# Patient Record
Sex: MALE | Race: WHITE | Employment: FULL TIME | URBAN - METROPOLITAN AREA
[De-identification: names, ages, dates, MRNs, and addresses within clinical notes are randomized per-mention and may not be internally consistent; named-entity substitution may affect disease eponyms.]

---

## 2017-01-05 ENCOUNTER — TELEPHONE (OUTPATIENT)
Dept: FAMILY MEDICINE CLINIC | Age: 52
End: 2017-01-05

## 2017-01-05 NOTE — TELEPHONE ENCOUNTER
appt is 2/13/16 at 6:30 pm  Dr. Amber Seaman  office  [phone]  Fax [phone]        [phone]        Sched Appt Date/Time   Procedure Information   Procedure Modifiers Provider Requested Approved   [id number] - REFERRAL FOR COLONOSCOPY   1 1      Diagnosis Information   Diagnosis   Z00.00 (ICD-10-CM) - Annual physical exam      Referral Order   Order   REFERRAL FOR COLONOSCOPY (Order # [id number]) on 11/18/2016   View Encounter

## 2017-02-01 NOTE — TELEPHONE ENCOUNTER
Patient called for update. He has been on his insurance web site and does not see the referral request as of yet.

## 2017-02-13 ENCOUNTER — HOSPITAL ENCOUNTER (OUTPATIENT)
Age: 52
Setting detail: OUTPATIENT SURGERY
Discharge: HOME | End: 2017-02-13
Attending: INTERNAL MEDICINE | Admitting: INTERNAL MEDICINE
Payer: OTHER GOVERNMENT

## 2017-02-13 ENCOUNTER — ANESTHESIA EVENT (OUTPATIENT)
Dept: ENDOSCOPY | Age: 52
End: 2017-02-13
Payer: OTHER GOVERNMENT

## 2017-02-13 ENCOUNTER — SURGERY (OUTPATIENT)
Age: 52
End: 2017-02-13

## 2017-02-13 ENCOUNTER — ANESTHESIA (OUTPATIENT)
Dept: ENDOSCOPY | Age: 52
End: 2017-02-13
Payer: OTHER GOVERNMENT

## 2017-02-13 VITALS
HEART RATE: 65 BPM | TEMPERATURE: 97.6 F | BODY MASS INDEX: 25.62 KG/M2 | WEIGHT: 179 LBS | HEIGHT: 70 IN | OXYGEN SATURATION: 100 % | RESPIRATION RATE: 14 BRPM | DIASTOLIC BLOOD PRESSURE: 87 MMHG | SYSTOLIC BLOOD PRESSURE: 133 MMHG

## 2017-02-13 PROBLEM — Z98.890 S/P COLONOSCOPY: Status: ACTIVE | Noted: 2017-02-13

## 2017-02-13 PROCEDURE — 77030011640 HC PAD GRND REM COVD -A: Performed by: INTERNAL MEDICINE

## 2017-02-13 PROCEDURE — 74011250636 HC RX REV CODE- 250/636: Performed by: INTERNAL MEDICINE

## 2017-02-13 PROCEDURE — 88305 TISSUE EXAM BY PATHOLOGIST: CPT | Performed by: INTERNAL MEDICINE

## 2017-02-13 PROCEDURE — 77030013992 HC SNR POLYP ENDOSC BSC -B: Performed by: INTERNAL MEDICINE

## 2017-02-13 PROCEDURE — 76040000019: Performed by: INTERNAL MEDICINE

## 2017-02-13 PROCEDURE — 74011250636 HC RX REV CODE- 250/636

## 2017-02-13 PROCEDURE — 76060000031 HC ANESTHESIA FIRST 0.5 HR: Performed by: INTERNAL MEDICINE

## 2017-02-13 PROCEDURE — 77030009426 HC FCPS BIOP ENDOSC BSC -B: Performed by: INTERNAL MEDICINE

## 2017-02-13 RX ORDER — PROPOFOL 10 MG/ML
INJECTION, EMULSION INTRAVENOUS AS NEEDED
Status: DISCONTINUED | OUTPATIENT
Start: 2017-02-13 | End: 2017-02-13 | Stop reason: HOSPADM

## 2017-02-13 RX ORDER — NALOXONE HYDROCHLORIDE 0.4 MG/ML
0.4 INJECTION, SOLUTION INTRAMUSCULAR; INTRAVENOUS; SUBCUTANEOUS
Status: DISCONTINUED | OUTPATIENT
Start: 2017-02-13 | End: 2017-02-13 | Stop reason: HOSPADM

## 2017-02-13 RX ORDER — PROPOFOL 10 MG/ML
INJECTION, EMULSION INTRAVENOUS
Status: DISCONTINUED | OUTPATIENT
Start: 2017-02-13 | End: 2017-02-13 | Stop reason: HOSPADM

## 2017-02-13 RX ORDER — SODIUM CHLORIDE 9 MG/ML
INJECTION, SOLUTION INTRAVENOUS
Status: DISCONTINUED | OUTPATIENT
Start: 2017-02-13 | End: 2017-02-13 | Stop reason: HOSPADM

## 2017-02-13 RX ORDER — MIDAZOLAM HYDROCHLORIDE 1 MG/ML
.25-5 INJECTION, SOLUTION INTRAMUSCULAR; INTRAVENOUS
Status: DISCONTINUED | OUTPATIENT
Start: 2017-02-13 | End: 2017-02-13 | Stop reason: HOSPADM

## 2017-02-13 RX ORDER — FENTANYL CITRATE 50 UG/ML
100 INJECTION, SOLUTION INTRAMUSCULAR; INTRAVENOUS
Status: DISCONTINUED | OUTPATIENT
Start: 2017-02-13 | End: 2017-02-13 | Stop reason: HOSPADM

## 2017-02-13 RX ORDER — ATROPINE SULFATE 0.1 MG/ML
0.5 INJECTION INTRAVENOUS
Status: DISCONTINUED | OUTPATIENT
Start: 2017-02-13 | End: 2017-02-13 | Stop reason: HOSPADM

## 2017-02-13 RX ORDER — SODIUM CHLORIDE 9 MG/ML
50 INJECTION, SOLUTION INTRAVENOUS CONTINUOUS
Status: DISCONTINUED | OUTPATIENT
Start: 2017-02-13 | End: 2017-02-13 | Stop reason: HOSPADM

## 2017-02-13 RX ORDER — EPINEPHRINE 0.1 MG/ML
1 INJECTION INTRACARDIAC; INTRAVENOUS
Status: DISCONTINUED | OUTPATIENT
Start: 2017-02-13 | End: 2017-02-13 | Stop reason: HOSPADM

## 2017-02-13 RX ORDER — DEXTROMETHORPHAN/PSEUDOEPHED 2.5-7.5/.8
1.2 DROPS ORAL
Status: DISCONTINUED | OUTPATIENT
Start: 2017-02-13 | End: 2017-02-13 | Stop reason: HOSPADM

## 2017-02-13 RX ORDER — FLUMAZENIL 0.1 MG/ML
0.2 INJECTION INTRAVENOUS
Status: DISCONTINUED | OUTPATIENT
Start: 2017-02-13 | End: 2017-02-13 | Stop reason: HOSPADM

## 2017-02-13 RX ADMIN — SODIUM CHLORIDE 50 ML/HR: 900 INJECTION, SOLUTION INTRAVENOUS at 07:18

## 2017-02-13 RX ADMIN — SODIUM CHLORIDE: 9 INJECTION, SOLUTION INTRAVENOUS at 08:06

## 2017-02-13 RX ADMIN — PROPOFOL 100 MG: 10 INJECTION, EMULSION INTRAVENOUS at 08:11

## 2017-02-13 RX ADMIN — PROPOFOL 100 MCG/KG/MIN: 10 INJECTION, EMULSION INTRAVENOUS at 08:11

## 2017-02-13 NOTE — ANESTHESIA POSTPROCEDURE EVALUATION
Post-Anesthesia Evaluation and Assessment    Patient: Gelacio Silver MRN: [id number]  SSN: [id number]    YOB: 1965  Age: 49 y.o. Sex: male       Cardiovascular Function/Vital Signs  Visit Vitals    /88    Pulse 74    Temp 36.4 °C (97.6 °F)    Resp 17    Ht 5' 10\" (1.778 m)    Wt 81.2 kg (179 lb)    SpO2 100%    BMI 25.68 kg/m2       Patient is status post MAC anesthesia for Procedure(s):  COLONOSCOPY  ENDOSCOPIC POLYPECTOMY. Nausea/Vomiting: None    Postoperative hydration reviewed and adequate. Pain:  Pain Scale 1: Numeric (0 - 10) (02/13/17 0851)  Pain Intensity 1: 0 (02/13/17 0851)   Managed    Neurological Status: At baseline    Mental Status and Level of Consciousness: Arousable    Pulmonary Status:   O2 Device: Room air (02/13/17 0851)   Adequate oxygenation and airway patent    Complications related to anesthesia: None    Post-anesthesia assessment completed.  No concerns    Signed By: Oriana Dumas MD     February 13, 2017

## 2017-02-13 NOTE — PROCEDURES
[address]  [phone]      2017    Colonoscopy Procedure Note  Charlene Huang  :  1965  [facility] Medical Record Number: [id number]    Indications:     Screening colonoscopy  PCP:  Ghazal Tadeo MD  Anesthesia/Sedation: Conscious Sedation/Moderate Sedation  Endoscopist:  Dr. Jaya Nixon  Complications:  None  Estimate Blood Loss:  None    Permit:  The indications, risks, benefits and alternatives were reviewed with the patient or their decision maker who was provided an opportunity to ask questions and all questions were answered. The specific risks of colonoscopy with conscious sedation were reviewed, including but not limited to anesthetic complication, bleeding, adverse drug reaction, missed lesion, infection, IV site reactions, and intestinal perforation which would lead to the need for surgical repair. Alternatives to colonoscopy including radiographic imaging, observation without testing, or laboratory testing were reviewed including the limitations of those alternatives. After considering the options and having all their questions answered, the patient or their decision maker provided both verbal and written consent to proceed. Procedure in Detail:  After obtaining informed consent, positioning of the patient in the left lateral decubitus position, and conduction of a pre-procedure pause or \"time out\" the endoscope was introduced into the anus and advanced to the terminal ileum. The quality of the colonic preparation was adequate. A careful inspection was made as the colonoscope was withdrawn, findings and interventions are described below.     Appendiceal orifice photgraphed    Findings:       - Diverticulosis; no inflammation  Two sessile transverse polyps, 8 and 12 mm size    Specimens:    both polyps removed cold snare    Complications:   None; patient tolerated the procedure well. Estimated blood loss: none    Impression:  colon polyps, diverticulosis    Recommendations:     - Repeat colonoscopy in 5 years. - high fiber diet    Thank you for entrusting me with this patient's care. Please do not hesitate to contact me with any questions or if I can be of assistance with any of your other patients' GI needs.     Signed By: Zulay Leiva MD                        February 13, 2017

## 2017-02-13 NOTE — DISCHARGE INSTRUCTIONS
Ocalasonny Pulido  [id number]  1965    DISCHARGE INSTRUCTIONS  Discomfort:  Redness at IV site- apply warm compress to area; if redness or soreness persist- contact your physician. There may be a slight amount of blood passed from the rectum. Gaseous discomfort - walking, belching will help relieve any discomfort. You may not operate a vehicle for 12 hours. You may not engage in an occupation involving machinery or appliances for rest of today. You may not drink alcoholic beverages for at least 12 hours. Avoid making any critical decisions for at least 24 hours. DIET:   High fiber diet. - however -  remember your colon is empty and a heavy meal will produce gas. Avoid these foods:  vegetables, fried / greasy foods, carbonated drinks for today. ACTIVITY:  You may resume your normal daily activities it is recommended that you spend the remainder of the day resting -  avoid any strenuous activity. CALL M.D.   ANY SIGN OF:   Increasing pain, nausea, vomiting  Abdominal distension (swelling)  New increased bleeding (oral or rectal)  Fever (chills)  Pain in chest area  Bloody discharge from nose or mouth  Shortness of breath     Follow-up Instructions:  See Dr. Veto Guzmán in five years for follow up exam      DISCHARGE SUMMARY from Nurse    The following personal items collected during your admission are returned to you:   Dental Appliance: Dental Appliances: None  Vision: Visual Aid: Glasses, At home  Hearing Aid:    Jewelry:    Clothing:    Other Valuables:    Valuables sent to safe:

## 2017-02-13 NOTE — IP AVS SNAPSHOT
[address] 
[phone] Patient: Thomas Rg MRN: [id number] :1965 You are allergic to the following No active allergies Recent Documentation Height Weight BMI Smoking Status 1.778 m 81.2 kg 25.68 kg/m2 Never Smoker Emergency Contacts Name Discharge Info Relation Home Work Mobile Hattie Gibbs DISCHARGE CAREGIVER [3] Spouse [3] [phone] About your hospitalization You were admitted on:  2017 You last received care in the:  [facility] ENDOSCOPY You were discharged on:  2017 Unit phone number:  [phone] Why you were hospitalized Your primary diagnosis was:  Not on File Providers Seen During Your Hospitalizations Provider Role Specialty Primary office phone Bina Duarte MD Attending Provider Gastroenterology [phone] Your Primary Care Physician (PCP) Primary Care Physician Office Phone Office Fax Zach Nelson [phone] Follow-up Information Follow up With Details Comments Contact Info MD Gabby Gilbert [facility] [phone] [address] 
[phone] Current Discharge Medication List  
  
CONTINUE these medications which have NOT CHANGED Dose & Instructions Dispensing Information Comments Morning Noon Evening Bedtime GINKOBA PO Your next dose is: Today, Tomorrow Other:  _________ Take  by mouth. Refills:  0 MULTIVITAMIN PO Your next dose is: Today, Tomorrow Other:  _________ Take  by mouth. Refills:  0 Omeprazole delayed release 20 mg tablet Commonly known as:  PRILOSEC D/R Your next dose is: Today, Tomorrow Other:  _________  Dose:  20 mg  
 Take 20 mg by mouth daily. Refills:  0 Discharge Instructions [facility] 
[id number] 
1965 DISCHARGE INSTRUCTIONS Discomfort: 
Redness at IV site- apply warm compress to area; if redness or soreness persist- contact your physician. There may be a slight amount of blood passed from the rectum. Gaseous discomfort - walking, belching will help relieve any discomfort. You may not operate a vehicle for 12 hours. You may not engage in an occupation involving machinery or appliances for rest of today. You may not drink alcoholic beverages for at least 12 hours. Avoid making any critical decisions for at least 24 hours. DIET: 
 High fiber diet.  however -  remember your colon is empty and a heavy meal will produce gas. Avoid these foods:  vegetables, fried / greasy foods, carbonated drinks for today. ACTIVITY: 
You may resume your normal daily activities it is recommended that you spend the remainder of the day resting -  avoid any strenuous activity. CALL M.D. ANY SIGN OF: Increasing pain, nausea, vomiting Abdominal distension (swelling) New increased bleeding (oral or rectal) Fever (chills) Pain in chest area Bloody discharge from nose or mouth Shortness of breath Follow-up Instructions: 
See Dr. Tamra Marcus in five years for follow up exam 
 
 
DISCHARGE SUMMARY from Nurse The following personal items collected during your admission are returned to you:  
Dental Appliance: Dental Appliances: None Vision: Visual Aid: Glasses, At home Hearing Aid:   
Jewelry:   
Clothing:   
Other Valuables:   
Valuables sent to safe:   
 
 
Discharge Orders None Introducing [facility]! Mauricio Merida introduces fromAtoB patient portal. Now you can access parts of your medical record, email your doctor's office, and request medication refills online. 1. In your internet browser, go to [email or web address] 2. Click on the First Time User? Click Here link in the Sign In box. You will see the New Member Sign Up page. 3. Enter your [facility] Access Code exactly as it appears below. You will not need to use this code after youve completed the sign-up process. If you do not sign up before the expiration date, you must request a new code. · [facility] Access Code: [email or web address] Expires: 5/14/2017  6:28 AM 
 
4. Enter the last four digits of your Social Security Number (xxxx) and Date of Birth (mm/dd/yyyy) as indicated and click Submit. You will be taken to the next sign-up page. 5. Create a [facility] ID. This will be your [facility] login ID and cannot be changed, so think of one that is secure and easy to remember. 6. Create a [facility] password. You can change your password at any time. 7. Enter your Password Reset Question and Answer. This can be used at a later time if you forget your password. 8. Enter your e-mail address. You will receive e-mail notification when new information is available in [address] 9. Click Sign Up. You can now view and download portions of your medical record. 10. Click the Download Summary menu link to download a portable copy of your medical information. If you have questions, please visit the Frequently Asked Questions section of the [facility] website. Remember, [facility] is NOT to be used for urgent needs. For medical emergencies, dial 911. Now available from your iPhone and Android! General Information Please provide this summary of care documentation to your next provider. Patient Signature:  ____________________________________________________________ Date:  ____________________________________________________________  
  
Marco Pieterie Provider Signature:  ____________________________________________________________ Date:  ____________________________________________________________

## 2017-02-13 NOTE — PERIOP NOTES
Pt resting comfortably on stretcher HOB up VSS call bell within reach awaiting MD for procedure will continue to monitor pt.

## 2017-02-13 NOTE — ANESTHESIA PREPROCEDURE EVALUATION
Anesthetic History   No history of anesthetic complications            Review of Systems / Medical History  Patient summary reviewed, nursing notes reviewed and pertinent labs reviewed    Pulmonary        Sleep apnea           Neuro/Psych   Within defined limits           Cardiovascular  Within defined limits                Exercise tolerance: >4 METS  Comments: Not on beta blocker   GI/Hepatic/Renal         Renal disease: stones       Endo/Other  Within defined limits           Other Findings   Comments: BPH         Physical Exam    Airway  Mallampati: II  TM Distance: 4 - 6 cm  Neck ROM: normal range of motion   Mouth opening: Normal     Cardiovascular  Regular rate and rhythm,  S1 and S2 normal,  no murmur, click, rub, or gallop  Rhythm: regular  Rate: normal         Dental    Dentition: Caps/crowns     Pulmonary  Breath sounds clear to auscultation               Abdominal  GI exam deferred       Other Findings            Anesthetic Plan    ASA: 2  Anesthesia type: MAC            Anesthetic plan and risks discussed with: Patient

## 2017-02-13 NOTE — PROGRESS NOTES
Darion Hernandez  1965  [id number]    Situation:  Verbal report received from: Nathan Coats RN  Procedure: Procedure(s):  COLONOSCOPY  ENDOSCOPIC POLYPECTOMY    Background:    Preoperative diagnosis: SCREENING  Postoperative diagnosis: Polyp removal diverticulosis    :  Dr. Kristina Philip  Assistant(s): Endoscopy Technician-1: Karla Patel  Endoscopy RN-1: Radha Coppola RN    Specimens:   ID Type Source Tests Collected by Time Destination   1 : polyp  Preservative   Pascual Reddy MD 2/13/2017 8830 Pathology     H. Pylori  no    Assessment:  Intra-procedure medications    Anesthesia gave intra-procedure sedation and medications, see anesthesia flow sheet yes    Intravenous fluids: NS@ KVO     Vital signs stable   yes    Abdominal assessment: round and soft   yes    Recommendation:  Discharge patient per MD order  yes.   Return to floor  outpatient  Family or Friend  spouse  Permission to share finding with family or friend yes

## 2017-02-13 NOTE — PERIOP NOTES
Assumed pt care taken to recovery via stretcher for further monitoring please see CRNA chart sedation/monitoring for procedures pt tolerated well.

## 2017-02-13 NOTE — H&P
Gastroenterology Outpatient History and Physical    Patient: Vonda Pulido    Physician: Veto Guzmán MD    Vital Signs: See RN notes    Allergies: No Known Allergies    Chief Complaint: colon cancer screening    History of Present Illness: No symptoms. No chest pain, SOB, edema, focal weakness, numbness    Justification for Procedure: age    History:  Past Medical History   Diagnosis Date    BPH (benign prostatic hyperplasia)     Calculus of kidney     H/O seasonal allergies     Obstructive sleep apnea      CPAP      Past Surgical History   Procedure Laterality Date    Hx appendectomy      Hx hernia repair        history of vasectomy    Pr transurethral incision of prostate      Pr laser surgery of eye       LASIC      Social History     Social History    Marital status: UNKNOWN     Spouse name: N/A    Number of children: N/A    Years of education: N/A     Social History Main Topics    Smoking status: Never Smoker    Smokeless tobacco: Never Used    Alcohol use 1.0 oz/week     2 Cans of beer per week    Drug use: No    Sexual activity: Yes     Partners: Female     Other Topics Concern    Not on file     Social History Narrative      Family History   Problem Relation Age of Onset    Diabetes Maternal Grandfather     Heart Disease Father      heart valve defect    Heart Attack Father       at 40 of ? defect    Stroke Maternal Grandfather     Kidney Disease Maternal Grandfather     Other Mother      Parkinson's Disease    Asthma Brother        Medications:   Prior to Admission medications    Medication Sig Start Date End Date Taking? Authorizing Provider   Omeprazole delayed release (PRILOSEC D/R) 20 mg tablet Take 20 mg by mouth daily. Historical Provider   loratadine (CLARITIN) 10 mg tablet Take 10 mg by mouth. Historical Provider   LACTOBACILLUS ACIDOPHILUS (PROBIOTIC PO) Take  by mouth. Historical Provider   GINKGO BILOBA (GINKOBA PO) Take  by mouth.     Historical Provider   DOCOSAHEXANOIC ACID/EPA (FISH OIL PO) Take  by mouth. Historical Provider   MULTIVITAMIN PO Take  by mouth. Historical Provider   naproxen (NAPROSYN) 500 mg tablet Take 500 mg by mouth daily as needed. Historical Provider       Physical Exam:   General: alert, cooperative, no distress   HEENT: Head: Normocephalic, no lesions, without obvious abnormality. Heart: regular rate and rhythm   Lungs: chest clear, no wheezing, rales, normal symmetric air entry   Abdominal: Bowel sounds are normal, liver is not enlarged, spleen is not enlarged           Findings/Diagnosis: colon cancer screening    Plan of Care/Planned Procedure: I've discussed colonoscopy possible biopsy, polypectomy, cautery, injection, alternatives, complications including but not limited to pain, cardiopulmonary event, bleeding, perforation requiring additional blood transfusion or operative repair; all questions answered.     Signed By: Camden Driscoll MD     February 13, 2017

## 2017-04-06 ENCOUNTER — HOSPITAL ENCOUNTER (OUTPATIENT)
Dept: GENERAL RADIOLOGY | Age: 52
Discharge: HOME | End: 2017-04-06
Attending: INTERNAL MEDICINE
Payer: OTHER GOVERNMENT

## 2017-04-06 DIAGNOSIS — R10.13 ABDOMINAL PAIN, EPIGASTRIC: ICD-10-CM

## 2017-04-06 DIAGNOSIS — R10.12 ABDOMINAL PAIN, LEFT UPPER QUADRANT: ICD-10-CM

## 2017-04-06 PROCEDURE — 74247 XR UPPER GI W KUB AIR CONT: CPT

## 2017-07-10 ENCOUNTER — TELEPHONE (OUTPATIENT)
Dept: FAMILY MEDICINE CLINIC | Age: 52
End: 2017-07-10

## 2017-07-10 NOTE — TELEPHONE ENCOUNTER
----- Message from Walter Meehan sent at 7/10/2017  3:00 PM EDT -----  Regarding: Arnold/telephone  Pt requesting referral to [facility] [phone]. Has appt on 07/19/17 at 8:30am. Best contact number [phone].

## 2017-07-12 NOTE — TELEPHONE ENCOUNTER
Sylvester Mann, [facility] [address], returned call to Apolinar Pathak.     Call taken

## 2017-07-13 DIAGNOSIS — G47.33 OBSTRUCTIVE SLEEP APNEA: Primary | ICD-10-CM

## 2017-11-13 ENCOUNTER — OFFICE VISIT (OUTPATIENT)
Dept: FAMILY MEDICINE CLINIC | Age: 52
End: 2017-11-13

## 2017-11-13 VITALS
RESPIRATION RATE: 16 BRPM | HEIGHT: 70 IN | BODY MASS INDEX: 27.03 KG/M2 | SYSTOLIC BLOOD PRESSURE: 148 MMHG | WEIGHT: 188.8 LBS | TEMPERATURE: 98.4 F | OXYGEN SATURATION: 96 % | HEART RATE: 68 BPM | DIASTOLIC BLOOD PRESSURE: 91 MMHG

## 2017-11-13 DIAGNOSIS — K57.90 DIVERTICULOSIS OF INTESTINE WITHOUT BLEEDING, UNSPECIFIED INTESTINAL TRACT LOCATION: ICD-10-CM

## 2017-11-13 DIAGNOSIS — Z13.31 DEPRESSION SCREENING: ICD-10-CM

## 2017-11-13 DIAGNOSIS — Z23 ENCOUNTER FOR IMMUNIZATION: ICD-10-CM

## 2017-11-13 DIAGNOSIS — K44.9 SLIDING HIATAL HERNIA: ICD-10-CM

## 2017-11-13 DIAGNOSIS — Z00.00 ANNUAL PHYSICAL EXAM: Primary | ICD-10-CM

## 2017-11-13 DIAGNOSIS — Z98.890 S/P COLONOSCOPY: ICD-10-CM

## 2017-11-13 DIAGNOSIS — L81.9 HYPOPIGMENTATION: ICD-10-CM

## 2017-11-13 PROBLEM — K63.5 POLYP OF TRANSVERSE COLON: Status: ACTIVE | Noted: 2017-11-13

## 2017-11-13 NOTE — PROGRESS NOTES
Chief Complaint   Patient presents with    Complete Physical    Skin Problem     Light Patches located on right shoulder and back    Immunization/Injection     Influenza Vaccine       1. Have you been to the ER, urgent care clinic since your last visit? Hospitalized since your last visit? No    2. Have you seen or consulted any other health care providers outside of the [address] [place] [address] since your last visit? Include any pap smears or colon screening.  Yes Reason for visit: Dr. Cindy uSh, [facility]

## 2017-11-13 NOTE — PROGRESS NOTES
Subjective: Ángel Amaya is an 47 y.o. male with known BPH s/p transurethral resection, diverticulosis, small hiatal hernia  who presents for complete physical exam.    Doing well. No complaints today, has couple of hypopigmented lesions on the back which he wants to be checked. Non itching, no bleeding, non bothersome. He used shaving cream  2 months ago and thinks since then the patches are getting bigger. Diet: Healthy, his wife is following weight loss diet and he eats when she cooks at home. Pleaty of salads, fiber, eats mostly chicken and fish, no red meat, drinks plenty of pure water, no junk food at all. Exercise: Doing elliptical at home, walking with dogs every day. He had colonoscopy with Dr. Juanjo Cardozo on 2/2017 was found to have 2 sessile polyps ( 8 and 12 mm) and diverticulosis w/o diverticulitis. Social hx: Non smoker. Social drinker, drinks 1-2 drinks a week ( beer or wine)    Allergies - reviewed:   No Known Allergies      Medications - reviewed:  Current Outpatient Prescriptions   Medication Sig    Omeprazole delayed release (PRILOSEC D/R) 20 mg tablet Take 20 mg by mouth daily.  GINKGO BILOBA (GINKOBA PO) Take  by mouth.  MULTIVITAMIN PO Take  by mouth. No current facility-administered medications for this visit.           Past Medical History - reviewed:  Past Medical History:   Diagnosis Date    BPH (benign prostatic hyperplasia)     Calculus of kidney     Diverticulosis     H/O seasonal allergies     Hiatal hernia     Obstructive sleep apnea     CPAP         Past Surgical History - reviewed:  Past Surgical History:   Procedure Laterality Date    COLONOSCOPY N/A 2/13/2017    COLONOSCOPY performed by Amy Pineda MD at [address] [place] [address] HX APPENDECTOMY      [facility]     history of vasectomy    LASER SURGERY OF EYE      LASIC    TRANSURETHRAL INCISION OF PROSTATE  2008         Family History - reviewed:  Family History   Problem Relation Age of Onset    Other Mother      Parkinson's Disease    Heart Disease Father      heart valve defect    Heart Attack Father       at 40 of ? defect    Diabetes Maternal Grandfather     Stroke Maternal Grandfather     Kidney Disease Maternal Grandfather     Asthma Brother          Social History - reviewed:  Social History     Social History    Marital status:      Spouse name: N/A    Number of children: N/A    Years of education: N/A     Occupational History    Not on file. Social History Main Topics    Smoking status: Never Smoker    Smokeless tobacco: Never Used    Alcohol use 1.0 oz/week     2 Cans of beer per week    Drug use: No    Sexual activity: Yes     Partners: Female     Other Topics Concern    Not on file     Social History Narrative         Immunizations - reviewed:   Immunization History   Administered Date(s) Administered    Influenza Vaccine 2015    Influenza Vaccine (Quad) PF 2016, 2017    Tdap 2015     Flu: Due today        Health Maintenance reviewed -  Colonoscopy Done in 2017- 2 Sessile polyps ( 8 and 12 mm), recommended to repeat in 5 years. DEXA scan Not indicated  HIV testing Declined  Hepatitis C testing Done on 2016, negative  Lung cancer screening Not indicated, lifetime non smoker      Review of Systems   CONSTITUTIONAL: denies fever. Denies chills. EYES: denies double vision. Has blurry vision relieved with contacts. ENT: denies sinus congestion. Denies sinus drainage  CARDIOVASCULAR: denies chest pain. Denies palpitations  RESPIRATORY: denies shortness of breath  GI: denies abdominal pain. Denies change in stools. Denies hematochezia/melana  : No dysuria, no nocturia, no urinary frequency. NEURO: No headache, no dizziness. MUSCULOSKELETAL: denies joint pain. SKIN: denies rash. Denies easy bruising  PSYCH: denies anxiety.  Denies depression      Objective:     Visit Vitals    BP (!) 148/91    Pulse 68    Temp 98.4 °F (36.9 °C) (Oral)    Resp 16    Ht 5' 10\" (1.778 m)    Wt 188 lb 12.8 oz (85.6 kg)    SpO2 96%    BMI 27.09 kg/m2       General appearance - alert, well appearing, and in no distress  Eyes - pupils equal and reactive, extraocular eye movements intact  Ears - bilateral TM's and external ear canals normal  Nose - normal and patent, no erythema, discharge or polyps  Mouth - mucous membranes moist, pharynx normal without lesions  Neck - supple, no significant adenopathy  Chest - clear to auscultation, no wheezes, rales or rhonchi, symmetric air entry  Heart - normal rate, regular rhythm, normal S1, S2, no murmurs, rubs, clicks or gallops  Abdomen - soft, nontender, nondistended, no masses or organomegaly  Neurological - alert, oriented, normal speech, no focal findings or movement disorder noted  Musculoskeletal - no joint tenderness, deformity or swelling  Extremities - peripheral pulses normal, no pedal edema, no clubbing or cyanosis  Skin - normal coloration and turgor, no rashes,  Couple of flat non raised  hypopigmented areas approximately 0.5x 0.5 cm with maximum of 0.5 x 1.0 cm. No scaling, no signs of infection, no bleeding. Hair growth is seen in the area of hypopigmentation. PHQ 9 score is 0. Assessment:     46 yo male with known diverticulosis, BPH s/p transurethral resection who is here for complete physical exam.     ICD-10-CM ICD-9-CM    1. Annual physical exam Z00.00 V70.0 CBC W/O DIFF      METABOLIC PANEL, COMPREHENSIVE      LIPID PANEL      PSA W/ REFLX FREE PSA      HEMOGLOBIN A1C WITH EAG   2. S/P colonoscopy Z98.890 V45.89    3. Depression screening Z13.89 V79.0 MD DEPRESSION SCREEN ANNUAL   4. Encounter for immunization Z23 V03.89 INFLUENZA VIRUS VAC QUAD,SPLIT,PRESV FREE SYRINGE IM      MD IMMUNIZ ADMIN,1 SINGLE/COMB VAC/TOXOID   5. Hypopigmentation L81.9 709.00    6. Sliding hiatal hernia K44.9 553.3    7.  Diverticulosis of intestine without bleeding, unspecified intestinal tract location K57.90 562.10            Plan:   · Counseled re: diet, exercise, healthy lifestyle  · Appropriate labs, vaccines, imaging studies, and referrals ordered as listed above. Per patient request PSA was collected. Discussed the pros and cons of checking this lab. · Continue physical activity daily  · Hypopigmented skin: No signs of infection, no signs of eczema, no concerns for fungal infection. Will watch clinically. · Diverticulosis. No signs of diverticulitis. Benign physical exam. Encouraged high fiber diet. · Influenza vaccine was given today    Follow-up Disposition:  Return in about 1 year (around 11/13/2018) for Annual exam.      I have discussed the diagnosis with the patient and the intended plan as seen in the above orders. The patient has received an after-visit summary and questions were answered concerning future plans. I have discussed medication side effects and warnings with the patient as well. Informed pt to return to the office if new symptoms arise.       Ender Zhou MD  Family Medicine Resident,PGY-2

## 2017-11-13 NOTE — PATIENT INSTRUCTIONS

## 2017-11-14 ENCOUNTER — TELEPHONE (OUTPATIENT)
Dept: FAMILY MEDICINE CLINIC | Age: 52
End: 2017-11-14

## 2017-11-14 DIAGNOSIS — E78.5 DYSLIPIDEMIA: Primary | ICD-10-CM

## 2017-11-14 LAB
ALBUMIN SERPL-MCNC: 4.6 G/DL (ref 3.5–5.5)
ALBUMIN/GLOB SERPL: 1.7 {RATIO} (ref 1.2–2.2)
ALP SERPL-CCNC: 70 IU/L (ref 39–117)
ALT SERPL-CCNC: 25 IU/L (ref 0–44)
AST SERPL-CCNC: 15 IU/L (ref 0–40)
BILIRUB SERPL-MCNC: 0.4 MG/DL (ref 0–1.2)
BUN SERPL-MCNC: 13 MG/DL (ref 6–24)
BUN/CREAT SERPL: 14 (ref 9–20)
CALCIUM SERPL-MCNC: 9.3 MG/DL (ref 8.7–10.2)
CHLORIDE SERPL-SCNC: 99 MMOL/L (ref 96–106)
CHOLEST SERPL-MCNC: 220 MG/DL (ref 100–199)
CO2 SERPL-SCNC: 22 MMOL/L (ref 18–29)
CREAT SERPL-MCNC: 0.95 MG/DL (ref 0.76–1.27)
ERYTHROCYTE [DISTWIDTH] IN BLOOD BY AUTOMATED COUNT: 13.4 % (ref 12.3–15.4)
EST. AVERAGE GLUCOSE BLD GHB EST-MCNC: 103 MG/DL
GFR SERPLBLD CREATININE-BSD FMLA CKD-EPI: 107 ML/MIN/1.73
GFR SERPLBLD CREATININE-BSD FMLA CKD-EPI: 92 ML/MIN/1.73
GLOBULIN SER CALC-MCNC: 2.7 G/DL (ref 1.5–4.5)
GLUCOSE SERPL-MCNC: 88 MG/DL (ref 65–99)
HBA1C MFR BLD: 5.2 % (ref 4.8–5.6)
HCT VFR BLD AUTO: 44.7 % (ref 37.5–51)
HDLC SERPL-MCNC: 45 MG/DL
HGB BLD-MCNC: 15.6 G/DL (ref 12.6–17.7)
INTERPRETATION, 910389: NORMAL
LDLC SERPL CALC-MCNC: 110 MG/DL (ref 0–99)
MCH RBC QN AUTO: 29.4 PG (ref 26.6–33)
MCHC RBC AUTO-ENTMCNC: 34.9 G/DL (ref 31.5–35.7)
MCV RBC AUTO: 84 FL (ref 79–97)
PLATELET # BLD AUTO: 224 X10E3/UL (ref 150–379)
POTASSIUM SERPL-SCNC: 3.9 MMOL/L (ref 3.5–5.2)
PROT SERPL-MCNC: 7.3 G/DL (ref 6–8.5)
PSA SERPL-MCNC: 0.6 NG/ML (ref 0–4)
RBC # BLD AUTO: 5.31 X10E6/UL (ref 4.14–5.8)
REFLEX CRITERIA: NORMAL
SODIUM SERPL-SCNC: 138 MMOL/L (ref 134–144)
TRIGL SERPL-MCNC: 325 MG/DL (ref 0–149)
VLDLC SERPL CALC-MCNC: 65 MG/DL (ref 5–40)
WBC # BLD AUTO: 7.2 X10E3/UL (ref 3.4–10.8)

## 2017-11-14 NOTE — TELEPHONE ENCOUNTER
I called the patient at [phone] to discuss the test results. The patient was identified by 2 identifiers. Discussed results Discussed with the options of the treatment. He would like to work more on the diet and exercise and recheck the lipid panel in 6 months. If TG remain elevated we might star the treatment with medication.     3:03 PM  11/14/2017  Bronwyn oFnseca MD

## 2017-11-14 NOTE — PROGRESS NOTES
CBC WNL, CMP with elevated potasium level of 6.1. Lipid panel with , Tg 325 HDl 45,  10-year ASCVD risk is 6.2%, the pt will benefit from moderate intensity statin. PSA is 0.6. HgA1C is 5. 2. Will call and discuss.

## 2017-11-24 ENCOUNTER — TELEPHONE (OUTPATIENT)
Dept: FAMILY MEDICINE CLINIC | Age: 52
End: 2017-11-24

## 2017-11-29 DIAGNOSIS — R11.10 VOMITING, INTRACTABILITY OF VOMITING NOT SPECIFIED, PRESENCE OF NAUSEA NOT SPECIFIED, UNSPECIFIED VOMITING TYPE: Primary | ICD-10-CM

## 2017-12-31 ENCOUNTER — APPOINTMENT (OUTPATIENT)
Dept: GENERAL RADIOLOGY | Age: 52
End: 2017-12-31
Attending: EMERGENCY MEDICINE
Payer: OTHER GOVERNMENT

## 2017-12-31 ENCOUNTER — HOSPITAL ENCOUNTER (EMERGENCY)
Age: 52
Discharge: HOME | End: 2017-12-31
Attending: EMERGENCY MEDICINE
Payer: OTHER GOVERNMENT

## 2017-12-31 VITALS
WEIGHT: 184 LBS | BODY MASS INDEX: 26.34 KG/M2 | TEMPERATURE: 98.4 F | SYSTOLIC BLOOD PRESSURE: 173 MMHG | OXYGEN SATURATION: 96 % | HEIGHT: 70 IN | DIASTOLIC BLOOD PRESSURE: 105 MMHG | RESPIRATION RATE: 16 BRPM

## 2017-12-31 DIAGNOSIS — J20.9 ACUTE BRONCHITIS, UNSPECIFIED ORGANISM: Primary | ICD-10-CM

## 2017-12-31 PROCEDURE — 71020 XR CHEST PA LAT: CPT

## 2017-12-31 PROCEDURE — 99281 EMR DPT VST MAYX REQ PHY/QHP: CPT

## 2017-12-31 RX ORDER — ALBUTEROL SULFATE 90 UG/1
2 AEROSOL, METERED RESPIRATORY (INHALATION)
Qty: 1 INHALER | Refills: 0 | Status: SHIPPED | OUTPATIENT
Start: 2017-12-31 | End: 2018-02-08 | Stop reason: SDUPTHER

## 2017-12-31 RX ORDER — PREDNISONE 20 MG/1
40 TABLET ORAL DAILY
Qty: 10 TAB | Refills: 0 | Status: SHIPPED | OUTPATIENT
Start: 2017-12-31 | End: 2018-01-05

## 2017-12-31 RX ORDER — AZITHROMYCIN 250 MG/1
TABLET, FILM COATED ORAL
Qty: 6 TAB | Refills: 0 | Status: SHIPPED | OUTPATIENT
Start: 2017-12-31 | End: 2018-01-05

## 2018-01-01 NOTE — ED PROVIDER NOTES
HPI Comments: 47 y.o. male with past medical history significant for MARCIAL, chronic bronchitis, COPD, BPH, hiatal hernia, diverticulosis and season allergies who presents ambulatory from home accompanied by his wife with chief complaint of cough. Pt reports 3-day history of general malaise. Pt states cough, SOB and sore throat onset yesterday. Pt presents today because cough produced blood-streaked sputum today. Pt notes he was febrile 3 days ago when symptoms onset. Pt elicits previous history of COPD and chronic bronchitis. PT traveled to [place] last week. Pertinent negatives include ill-contacts, congestion, nosebleeds, TB and positive PPD test. There are no other acute medical concerns at this time. Social hx: denies tobacco use; social EtOH use; denies illicit drug use  PCP: Bk Stephen MD    Note written by Sarah Bustillo, as dictated by Davis Messina MD 8:01 PM         The history is provided by the patient. No  was used.         Past Medical History:   Diagnosis Date    BPH (benign prostatic hyperplasia)     Calculus of kidney     Diverticulosis     H/O seasonal allergies     Hiatal hernia     Obstructive sleep apnea     CPAP       Past Surgical History:   Procedure Laterality Date    COLONOSCOPY N/A 2017    COLONOSCOPY performed by Alcides Mak MD at [address] [facility] HX APPENDECTOMY      [facility]     history of vasectomy    LASER SURGERY OF EYE      LASIC    TRANSURETHRAL INCISION OF PROSTATE           Family History:   Problem Relation Age of Onset    Other Mother      Parkinson's Disease    Heart Disease Father      heart valve defect    Heart Attack Father       at 40 of ? defect    Diabetes Maternal Grandfather     Stroke Maternal Grandfather     Kidney Disease Maternal Grandfather     Asthma Brother        Social History     Social History    Marital status:      Spouse name: N/A    Number of children: N/A    Years of education: N/A     Occupational History    Not on file. Social History Main Topics    Smoking status: Never Smoker    Smokeless tobacco: Never Used    Alcohol use 1.0 oz/week     2 Cans of beer per week    Drug use: No    Sexual activity: Yes     Partners: Female     Other Topics Concern    Not on file     Social History Narrative         ALLERGIES: Review of patient's allergies indicates no known allergies. Review of Systems   Constitutional: Negative. Negative for appetite change, fever and unexpected weight change. HENT: Positive for sore throat. Negative for congestion, ear pain, hearing loss, nosebleeds, rhinorrhea and trouble swallowing. Respiratory: Positive for cough and shortness of breath. Negative for chest tightness. Cardiovascular: Negative. Negative for chest pain and palpitations. Gastrointestinal: Negative. Negative for abdominal distention, abdominal pain, blood in stool and vomiting. Endocrine: Negative. Genitourinary: Negative for dysuria and hematuria. Musculoskeletal: Negative. Negative for back pain and myalgias. Skin: Negative. Negative for rash. Allergic/Immunologic: Negative. Neurological: Negative. Negative for dizziness, syncope, weakness and numbness. Hematological: Negative. Psychiatric/Behavioral: Negative. All other systems reviewed and are negative. Vitals:    12/31/17 1935   BP: (!) 173/105   Resp: 16   Temp: 98.4 °F (36.9 °C)   SpO2: 96%   Weight: 83.5 kg (184 lb)   Height: 5' 10\" (1.778 m)            Physical Exam   Constitutional: He is oriented to person, place, and time. He appears well-developed and well-nourished. No distress. HENT:   Head: Normocephalic and atraumatic. Right Ear: External ear normal.   Left Ear: External ear normal.   Nose: Nose normal.   Mouth/Throat: Oropharynx is clear and moist.   Eyes: Conjunctivae and EOM are normal. Pupils are equal, round, and reactive to light.    Neck: Normal range of motion. Neck supple. No JVD present. No thyromegaly present. Cardiovascular: Normal rate, regular rhythm, normal heart sounds and intact distal pulses. No murmur heard. Pulmonary/Chest: Effort normal and breath sounds normal. No respiratory distress. He has no wheezes. He has no rales. Abdominal: Soft. Bowel sounds are normal. He exhibits no distension. There is no tenderness. Musculoskeletal: Normal range of motion. He exhibits no edema. Neurological: He is alert and oriented to person, place, and time. No cranial nerve deficit. Skin: Skin is warm and dry. No rash noted. Psychiatric: He has a normal mood and affect. His behavior is normal. Thought content normal.   Nursing note and vitals reviewed. Note written by Sarah Mccollum, as dictated by Rolf Rivera MD 8:01 PM     [facility]  ED Course       Procedures    8:21 PM  Chest x-ray negative. Assessment:  Acute bronchitis. Discussed available lab/imaging results with patient. Patient verbalizes understanding and agrees with care plan. Will discharge patient home with specific return precautions; albuterol inhaler and z-aakash; short-course prednisone; PCP follow-up towards the end of next week.

## 2018-01-01 NOTE — ED TRIAGE NOTES
Pt arrives with c/o of cough and burning in his chest. Pt states there is blood in his mucous. Denies, fever, nausea, diarrhea.

## 2018-01-01 NOTE — DISCHARGE INSTRUCTIONS
Bronchitis: Care Instructions  Your Care Instructions    Bronchitis is inflammation of the bronchial tubes, which carry air to the lungs. The tubes swell and produce mucus, or phlegm. The mucus and inflamed bronchial tubes make you cough. You may have trouble breathing. Most cases of bronchitis are caused by viruses like those that cause colds. Antibiotics usually do not help and they may be harmful. Bronchitis usually develops rapidly and lasts about 2 to 3 weeks in otherwise healthy people. Follow-up care is a key part of your treatment and safety. Be sure to make and go to all appointments, and call your doctor if you are having problems. It's also a good idea to know your test results and keep a list of the medicines you take. How can you care for yourself at home? · Take all medicines exactly as prescribed. Call your doctor if you think you are having a problem with your medicine. · Get some extra rest.  · Take an over-the-counter pain medicine, such as acetaminophen (Tylenol), ibuprofen (Advil, Motrin), or naproxen (Aleve) to reduce fever and relieve body aches. Read and follow all instructions on the label. · Do not take two or more pain medicines at the same time unless the doctor told you to. Many pain medicines have acetaminophen, which is Tylenol. Too much acetaminophen (Tylenol) can be harmful. · Take an over-the-counter cough medicine that contains dextromethorphan to help quiet a dry, hacking cough so that you can sleep. Avoid cough medicines that have more than one active ingredient. Read and follow all instructions on the label. · Breathe moist air from a humidifier, hot shower, or sink filled with hot water. The heat and moisture will thin mucus so you can cough it out. · Do not smoke. Smoking can make bronchitis worse. If you need help quitting, talk to your doctor about stop-smoking programs and medicines. These can increase your chances of quitting for good.   When should you call for help? Call 911 anytime you think you may need emergency care. For example, call if:  ? · You have severe trouble breathing. ?Call your doctor now or seek immediate medical care if:  ? · You have new or worse trouble breathing. ? · You cough up dark brown or bloody mucus (sputum). ? · You have a new or higher fever. ? · You have a new rash. ? Watch closely for changes in your health, and be sure to contact your doctor if:  ? · You cough more deeply or more often, especially if you notice more mucus or a change in the color of your mucus. ? · You are not getting better as expected. Where can you learn more? Go to http://aris-gilma.info/. Enter H333 in the search box to learn more about \"Bronchitis: Care Instructions. \"  Current as of: May 12, 2017  Content Version: 11.4  © 1810-0758 [employer]. Care instructions adapted under license by [employer] (which disclaims liability or warranty for this information). If you have questions about a medical condition or this instruction, always ask your healthcare professional. [address] any warranty or liability for your use of this information.

## 2018-01-23 ENCOUNTER — OFFICE VISIT (OUTPATIENT)
Dept: FAMILY MEDICINE CLINIC | Age: 53
End: 2018-01-23

## 2018-01-23 VITALS
HEART RATE: 79 BPM | TEMPERATURE: 98.4 F | RESPIRATION RATE: 18 BRPM | OXYGEN SATURATION: 96 % | DIASTOLIC BLOOD PRESSURE: 73 MMHG | SYSTOLIC BLOOD PRESSURE: 116 MMHG | BODY MASS INDEX: 27.92 KG/M2 | HEIGHT: 70 IN | WEIGHT: 195 LBS

## 2018-01-23 DIAGNOSIS — J40 BRONCHITIS: Primary | ICD-10-CM

## 2018-01-23 RX ORDER — PREDNISONE 20 MG/1
40 TABLET ORAL DAILY
Qty: 5 TAB | Refills: 0 | Status: SHIPPED | OUTPATIENT
Start: 2018-01-23 | End: 2018-02-08 | Stop reason: SDUPTHER

## 2018-01-23 RX ORDER — OMEPRAZOLE 20 MG/1
20 CAPSULE, DELAYED RELEASE ORAL DAILY
COMMUNITY
Start: 2017-12-26

## 2018-01-23 NOTE — PROGRESS NOTES
Subjective  Nallely Garcia is an 47 y.o. male who presents for evaluation of cough. PMHx of MARCIAL, chronic bronchitis, BPH, hiatal hernia, diverticulosis and season allergies. Reports he went to ED on 12/31 for fever, productive cough, and SOB. Lung exam was nml. Chest xray nml. He was treated with albuterol inhaler, Z-aakash, and short course prednisone. After completion of medication pt felt much better. However 2 days ago developed a dry cough that is persistent. Without fever, sore throat, SOB, CP, N/V/D, syncope. No sick contacts. Taking albuterol inhaler q4h and mucinex. Allergies - reviewed:   No Known Allergies      Medications - reviewed:   Current Outpatient Prescriptions   Medication Sig    predniSONE (DELTASONE) 20 mg tablet Take 2 Tabs by mouth daily.  albuterol (PROVENTIL HFA, VENTOLIN HFA, PROAIR HFA) 90 mcg/actuation inhaler Take 2 Puffs by inhalation every four (4) hours as needed for Wheezing.  Omeprazole delayed release (PRILOSEC D/R) 20 mg tablet Take 20 mg by mouth daily.  GINKGO BILOBA (GINKOBA PO) Take  by mouth.  MULTIVITAMIN PO Take  by mouth.  omeprazole (PRILOSEC) 20 mg capsule Take 20 mg by mouth daily. No current facility-administered medications for this visit.           Past Medical History - reviewed:  Past Medical History:   Diagnosis Date    BPH (benign prostatic hyperplasia)     Calculus of kidney     Diverticulosis     H/O seasonal allergies     Hiatal hernia     Obstructive sleep apnea     CPAP         Past Surgical History - reviewed:   Past Surgical History:   Procedure Laterality Date    COLONOSCOPY N/A 2/13/2017    COLONOSCOPY performed by Anna Langston MD at [address] [place] [address] HX APPENDECTOMY      [facility]     history of vasectomy    MS TRABECULOPLASTY BY LASER SURGERY      LASIC    TRANSURETHRAL INCISION OF PROSTATE  2008         Social History - reviewed:  Social History     Social History    Marital status:      Spouse name: N/A    Number of children: N/A    Years of education: N/A     Occupational History    Not on file. Social History Main Topics    Smoking status: Never Smoker    Smokeless tobacco: Never Used    Alcohol use 1.0 oz/week     2 Cans of beer per week    Drug use: No    Sexual activity: Yes     Partners: Female     Other Topics Concern    Not on file     Social History Narrative         Family History - reviewed:  Family History   Problem Relation Age of Onset    Other Mother      Parkinson's Disease    Heart Disease Father      heart valve defect    Heart Attack Father       at 40 of ? defect    Diabetes Maternal Grandfather     Stroke Maternal Grandfather     Kidney Disease Maternal Grandfather     Asthma Brother          Immunizations - reviewed:   Immunization History   Administered Date(s) Administered    Influenza Vaccine 2015    Influenza Vaccine (Quad) PF 2016, 2017    Tdap 2015         ROS  Constitutional: Negative. Negative for appetite change, fever and unexpected weight change. HENT: Negative for sore throat, congestion, ear pain, hearing loss, nosebleeds, rhinorrhea and trouble swallowing. Respiratory: Positive for cough. Negative for chest tightness. Cardiovascular: Negative. Negative for chest pain and palpitations. Gastrointestinal: Negative. Negative for abdominal distention, abdominal pain, blood in stool and vomiting. Endocrine: Negative. Genitourinary: Negative for dysuria and hematuria. Musculoskeletal: Negative. Negative for back pain and myalgias. Skin: Negative. Negative for rash. Allergic/Immunologic: Negative. Neurological: Negative. Negative for dizziness, syncope, weakness and numbness. Hematological: Negative. Psychiatric/Behavioral: Negative.         Physical Exam  Visit Vitals    /73    Pulse 79    Temp 98.4 °F (36.9 °C) (Oral)    Resp 18    Ht 5' 10\" (1.778 m)    Wt 195 lb (88.5 kg)    SpO2 96%    BMI 27.98 kg/m2       Constitutional: NAD. Not fatigued. Dry cough spell throughout visit that does not leave pt SOB. Nose: Nose normal.   Mouth/Throat: Oropharynx is clear and moist.   Eyes: Conjunctivae and EOM are normal. Pupils are equal, round, and reactive to light. Neck: Normal range of motion. Neck supple. No JVD present. No thyromegaly present. Cardiovascular: Normal rate, regular rhythm, normal heart sounds and intact distal pulses. No murmur heard. Pulmonary/Chest: Effort normal and breath sounds normal. No respiratory distress. He has no wheezes. He has faint ronchi on the LLL. Abdominal: Soft. Bowel sounds are normal. He exhibits no distension. There is no tenderness. Musculoskeletal: Normal range of motion. He exhibits no edema. Neurological: He is alert and oriented to person, place, and time. No cranial nerve deficit. Skin: Skin is warm and dry. No rash noted. Psychiatric: He has a normal mood and affect. His behavior is normal. Thought content normal.     Reviewed Xray from today and from 12/31. No change. Without acute pulm issue. Assessment/Plan    ICD-10-CM ICD-9-CM    1. Bronchitis J40 490 XR CHEST PA LAT      predniSONE (DELTASONE) 20 mg tablet     Chest xray is normal. PE reassuring. Perhaps pt is having an exacerbation of his bronchitis 2/2 to weather change. Will treat with short steroid burst. Pt to continue albuterol q4h and mucinex. Also discussed other potential inhalers that can help control symptoms pt states he is not open to adding more medications at this time. Pt advised to seek medical attention if develops CP, SOB, fever, blood in sputum. Precautions given. Pt understands plan and agreed with it. Follow-up Disposition:  Return if symptoms worsen or fail to improve. Plan was discussed with attending who then saw pt. I have discussed the diagnosis with the patient and the intended plan as seen in the above orders.  Patient verbalized understanding of the plan and agrees with the plan. The patient has received an after-visit summary and questions were answered concerning future plans. I have discussed medication side effects and warnings with the patient as well. Informed patient to return to the office if new symptoms arise.         Negro Bliss DO  Family Medicine Resident

## 2018-01-23 NOTE — MR AVS SNAPSHOT
[address] 
[phone] Patient: Nallely Garcia MRN: [id number] :[id number] Visit Information Date & Time Provider Department Dept. Phone Encounter #  
 2018  5:30 PM Handy AvilaMario [phone] [id number] Upcoming Health Maintenance Date Due FOBT Q 1 YEAR AGE 50-75 10/29/2016 DTaP/Tdap/Td series (2 - Td) 2025 Allergies as of 2018  Review Complete On: 2018 By: Handy Avila DO No Known Allergies Current Immunizations  Never Reviewed Name Date Influenza Vaccine 2015 Influenza Vaccine (Quad) PF 2017, 2016 Tdap 2015 Not reviewed this visit You Were Diagnosed With   
  
 Codes Comments Cough    -  Primary ICD-10-CM: G21 ICD-9-CM: 467. 2 Vitals BP Pulse Temp Resp Height(growth percentile) Weight(growth percentile) 116/73 79 98.4 °F (36.9 °C) (Oral) 18 5' 10\" (1.778 m) 195 lb (88.5 kg) SpO2 BMI Smoking Status 96% 27.98 kg/m2 Never Smoker Vitals History BMI and BSA Data Body Mass Index Body Surface Area  
 27.98 kg/m 2 2.09 m 2 Preferred Pharmacy Pharmacy Name Phone [facility] [place], [address] [place] AT [facility] [phone] Your Updated Medication List  
  
   
This list is accurate as of: 18  6:18 PM.  Always use your most recent med list.  
  
  
  
  
 albuterol 90 mcg/actuation inhaler Commonly known as:  PROVENTIL HFA, VENTOLIN HFA, PROAIR HFA Take 2 Puffs by inhalation every four (4) hours as needed for Wheezing. GINKOBA PO Take  by mouth. MULTIVITAMIN PO Take  by mouth. * Omeprazole delayed release 20 mg tablet Commonly known as:  PRILOSEC D/R Take 20 mg by mouth daily. * omeprazole 20 mg capsule Commonly known as:  PRILOSEC Take 20 mg by mouth daily. predniSONE 20 mg tablet Commonly known as:  Magnus Retana Take 2 Tabs by mouth daily. * Notice: This list has 2 medication(s) that are the same as other medications prescribed for you. Read the directions carefully, and ask your doctor or other care provider to review them with you. Prescriptions Sent to Pharmacy Refills  
 predniSONE (DELTASONE) 20 mg tablet 0 Sig: Take 2 Tabs by mouth daily. Class: Normal  
 Pharmacy: [facility] [address] [facility] #: [phone] Route: Oral  
  
To-Do List   
 01/23/2018 Imaging:  XR CHEST PA LAT Patient Instructions Discharge instructions: 1. Combination cough and could medicine such as Mucinex DM 2. Salt water gargle. 3. Plenty of fluids. 4. Ibuprofen (Motrin, Advil):  200mg - take 1-4 tables three times as needed for pain and fever 
 -OR-  
 Naproxin (Aleve):  220mg 1-2 tablets twice as needed for pain and fever 5. Acetaminophen (Tylenol):  500mg 1-2 tablets every 6 hours as needed for pain and fever. 6. Throat lozenges such as Halls as needed. 7. Humidifier as needed. Follow Up:  Get re-examined if not improved in  5-7 days or if symptoms worsen. If you get suddenly worse, go to the nearest hospital Emergency Room Learning About Chronic Bronchitis What is chronic bronchitis? Chronic bronchitis is long-term swelling and the buildup of mucus in the airways of your lungs. The airways (bronchial tubes) get inflamed and make a lot of mucus. This can narrow or block the airways, making it hard for you to breathe. It is a form of COPD (chronic obstructive pulmonary disease). Chronic bronchitis is usually caused by smoking. But chemical fumes, dust, or air pollution also can cause it over time. What can you expect when you have chronic bronchitis? Chronic bronchitis gets worse over time. You cannot undo the damage to your lungs. Over time, you may find that: 
· You get short of breath even when you do simple things like get dressed or fix a meal. 
· It is hard to eat or exercise. · You lose weight and feel weaker. Over many years, the swelling and mucus from chronic bronchitis make it more likely that you will get lung infections. But there are things you can do to prevent more damage and feel better. What are the symptoms? The main symptoms of chronic bronchitis are: · A cough that will not go away. · Mucus that comes up when you cough. · Shortness of breath that gets worse when you exercise. At times, your symptoms may suddenly flare up and get much worse. This is a called an exacerbation (say \"egg-ASHLEY-vitor-BAY-paivthra\"). When this happens, your usual symptoms quickly get worse and stay bad. This can be dangerous. You may have to go to the hospital. 
How can you keep chronic bronchitis from getting worse? Don't smoke. That is the best way to keep chronic bronchitis from getting worse. If you already smoke, it is never too late to stop. If you need help quitting, talk to your doctor about stop-smoking programs and medicines. These can increase your chances of quitting for good. You can do other things to keep chronic bronchitis from getting worse: · Avoid bad air. Air pollution, chemical fumes, and dust also can make chronic bronchitis worse. · Get a flu shot every year. A shot may keep the flu from turning into something more serious, like pneumonia. A flu shot also may lower your chances of having a flare-up. · Get a pneumococcal shot. A shot can prevent some of the serious complications of pneumonia. Ask your doctor how often you should get this shot. How is chronic bronchitis treated? Chronic bronchitis is treated with medicines and oxygen. You also can take steps at home to stay healthy and keep your condition from getting worse. Medicines and oxygen therapy · You may be taking medicines such as: ¨ Bronchodilators. These help open your airways and make breathing easier. Bronchodilators are either short-acting (work for 6 to 9 hours) or long-acting (work for 24 hours). You inhale most bronchodilators, so they start to act quickly. Always carry your quick-relief inhaler with you in case you need it while you are away from home. ¨ Corticosteroids. These reduce airway inflammation. They come in pill or inhaled form. You must take these medicines every day for them to work well. ¨ Antibiotics. These medicines are used when you have a bacterial lung infection. · Take your medicines exactly as prescribed. Call your doctor if you think you are having a problem with your medicine. · Oxygen therapy boosts the amount of oxygen in your blood and helps you breathe easier. Use the flow rate your doctor has recommended, and do not change it without talking to your doctor first. 
Other care at home · If your doctor recommends it, get more exercise. Walking is a good choice. Bit by bit, increase the amount you walk every day. Try for at least 30 minutes on most days of the week. · Learn breathing methods-such as breathing through pursed lips-to help you become less short of breath. · If your doctor has not set you up with a pulmonary rehabilitation program, talk to him or her about whether rehab is right for you. Rehab includes exercise programs, education about your disease and how to manage it, help with diet and other changes, and emotional support. · Eat regular, healthy meals. Use bronchodilators about 1 hour before you eat to make it easier to eat. Eat several small meals instead of three large ones. Drink beverages at the end of the meal. Avoid foods that are hard to chew. Follow-up care is a key part of your treatment and safety.  Be sure to make and go to all appointments, and call your doctor if you are having problems. It's also a good idea to know your test results and keep a list of the medicines you take. Where can you learn more? Go to http://aris-gilma.info/. Enter V410 in the search box to learn more about \"Learning About Chronic Bronchitis. \" Current as of: May 12, 2017 Content Version: 11.4 © 6574-8142 [employer]. Care instructions adapted under license by [employer] (which disclaims liability or warranty for this information). If you have questions about a medical condition or this instruction, always ask your healthcare professional. [place] [address] any warranty or liability for your use of this information. Guaifenesin (By mouth) Guaifenesin (ngil-VHO-k-sin) Thins mucus so you can clear it from your head, throat, and lungs. Brand Name(s): Allfen, Chest Congestion Relief, Children's Mucinex, Children's Mucus Relief, Cough, Diabetic Siltussin COBIAN-Na, Diabetic Tussin, Equate Mucus ER, [facility] Children's Mucus Relief, [facility] Mucus ER, [facility] Mucus Relief, [facility] Tab Tussin, [address] [place] [address], [address] [facility] Chest Congestion There may be other brand names for this medicine. When This Medicine Should Not Be Used: You should not use this medicine if you have ever had an allergic reaction to guaifenesin. Do not give any over-the-counter (OTC) cough and cold medicine to a baby or child under 3years old. Using these medicines in very young children might cause serious or possibly life-threatening side effects. How to Use This Medicine:  
Capsule, Tablet, Long Acting Capsule, Long Acting Tablet, Liquid · Your doctor will tell you how much medicine to use. Do not use more than directed. · Follow the instructions on the medicine label if you are using this medicine without a prescription. · Measure the oral liquid medicine with a marked measuring spoon, oral syringe, or medicine cup. · Do not break, chew, or crush the tablet or capsule. Swallow it whole with a full glass of water. If a dose is missed:  
· You must use this medicine on a fixed schedule. Call your doctor or pharmacist if you miss a dose. How to Store and Dispose of This Medicine: · Store the medicine in a closed container at room temperature, away from heat, moisture, and direct light. Do not freeze the oral liquid. · Ask your pharmacist, doctor, or health caregiver about the best way to dispose of any outdated medicine or medicine no longer needed. · Keep all medicine out of the reach of children. Never share your medicine with anyone. Drugs and Foods to Avoid: Ask your doctor or pharmacist before using any other medicine, including over-the-counter medicines, vitamins, and herbal products. Warnings While Using This Medicine: · If you are pregnant or breast feeding, ask your doctor or pharmacist before using this medicine. · If your cough is caused by asthma, emphysema, bronchitis, or smoking, talk to your doctor before using this medicine. · This medicine may contain alcohol. Possible Side Effects While Using This Medicine:  
Call your doctor right away if you notice any of these side effects: 
· Cough that lasts longer than 7 days. · Cough with fever, skin rash, or ongoing headache. If you notice these less serious side effects, talk with your doctor: · Drowsiness. · Nausea, vomiting, diarrhea, stomach pain. If you notice other side effects that you think are caused by this medicine, tell your doctor. Call your doctor for medical advice about side effects. You may report side effects to FDA at 6-750-FDA-0268 © 2017 [facility] Information is for End User's use only and may not be sold, redistributed or otherwise used for commercial purposes. The above information is an  only. It is not intended as medical advice for individual conditions or treatments. Talk to your doctor, nurse or pharmacist before following any medical regimen to see if it is safe and effective for you. Please provide this summary of care documentation to your next provider. Your primary care clinician is listed as Jamilah Webber. If you have any questions after today's visit, please call [phone].

## 2018-01-23 NOTE — PROGRESS NOTES
Chief Complaint   Patient presents with    Cough     had for 2 days     1. Have you been to the ER, urgent care clinic since your last visit? Hospitalized since your last visit? No    2. Have you seen or consulted any other health care providers outside of the [facility] since your last visit? Include any pap smears or colon screening.  No    Had bronchitis around the first of the year    Uses inhaler    otc cough--mucinex

## 2018-01-23 NOTE — PATIENT INSTRUCTIONS
Discharge instructions:  1. Combination cough and could medicine such as Mucinex DM  2. Salt water gargle. 3. Plenty of fluids. 4. Ibuprofen (Motrin, Advil):  200mg - take 1-4 tables three times as needed for pain and fever   -OR-    Naproxin (Aleve):  220mg 1-2 tablets twice as needed for pain and fever  5. Acetaminophen (Tylenol):  500mg 1-2 tablets every 6 hours as needed for pain and fever. 6. Throat lozenges such as Halls as needed. 7. Humidifier as needed. Follow Up:  Get re-examined if not improved in  5-7 days or if symptoms worsen. If you get suddenly worse, go to the nearest hospital Emergency Room         Learning About Chronic Bronchitis  What is chronic bronchitis? Chronic bronchitis is long-term swelling and the buildup of mucus in the airways of your lungs. The airways (bronchial tubes) get inflamed and make a lot of mucus. This can narrow or block the airways, making it hard for you to breathe. It is a form of COPD (chronic obstructive pulmonary disease). Chronic bronchitis is usually caused by smoking. But chemical fumes, dust, or air pollution also can cause it over time. What can you expect when you have chronic bronchitis? Chronic bronchitis gets worse over time. You cannot undo the damage to your lungs. Over time, you may find that:  · You get short of breath even when you do simple things like get dressed or fix a meal.  · It is hard to eat or exercise. · You lose weight and feel weaker. Over many years, the swelling and mucus from chronic bronchitis make it more likely that you will get lung infections. But there are things you can do to prevent more damage and feel better. What are the symptoms? The main symptoms of chronic bronchitis are:  · A cough that will not go away. · Mucus that comes up when you cough. · Shortness of breath that gets worse when you exercise. At times, your symptoms may suddenly flare up and get much worse.  This is a called an exacerbation (say \"egg-ZASS-er-BAY-shun\"). When this happens, your usual symptoms quickly get worse and stay bad. This can be dangerous. You may have to go to the hospital.  How can you keep chronic bronchitis from getting worse? Don't smoke. That is the best way to keep chronic bronchitis from getting worse. If you already smoke, it is never too late to stop. If you need help quitting, talk to your doctor about stop-smoking programs and medicines. These can increase your chances of quitting for good. You can do other things to keep chronic bronchitis from getting worse:  · Avoid bad air. Air pollution, chemical fumes, and dust also can make chronic bronchitis worse. · Get a flu shot every year. A shot may keep the flu from turning into something more serious, like pneumonia. A flu shot also may lower your chances of having a flare-up. · Get a pneumococcal shot. A shot can prevent some of the serious complications of pneumonia. Ask your doctor how often you should get this shot. How is chronic bronchitis treated? Chronic bronchitis is treated with medicines and oxygen. You also can take steps at home to stay healthy and keep your condition from getting worse. Medicines and oxygen therapy  · You may be taking medicines such as:  ¨ Bronchodilators. These help open your airways and make breathing easier. Bronchodilators are either short-acting (work for 6 to 9 hours) or long-acting (work for 24 hours). You inhale most bronchodilators, so they start to act quickly. Always carry your quick-relief inhaler with you in case you need it while you are away from home. ¨ Corticosteroids. These reduce airway inflammation. They come in pill or inhaled form. You must take these medicines every day for them to work well. ¨ Antibiotics. These medicines are used when you have a bacterial lung infection. · Take your medicines exactly as prescribed. Call your doctor if you think you are having a problem with your medicine.   · Oxygen therapy boosts the amount of oxygen in your blood and helps you breathe easier. Use the flow rate your doctor has recommended, and do not change it without talking to your doctor first.  Other care at home  · If your doctor recommends it, get more exercise. Walking is a good choice. Bit by bit, increase the amount you walk every day. Try for at least 30 minutes on most days of the week. · Learn breathing methods-such as breathing through pursed lips-to help you become less short of breath. · If your doctor has not set you up with a pulmonary rehabilitation program, talk to him or her about whether rehab is right for you. Rehab includes exercise programs, education about your disease and how to manage it, help with diet and other changes, and emotional support. · Eat regular, healthy meals. Use bronchodilators about 1 hour before you eat to make it easier to eat. Eat several small meals instead of three large ones. Drink beverages at the end of the meal. Avoid foods that are hard to chew. Follow-up care is a key part of your treatment and safety. Be sure to make and go to all appointments, and call your doctor if you are having problems. It's also a good idea to know your test results and keep a list of the medicines you take. Where can you learn more? Go to http://aris-gilma.info/. Enter I578 in the search box to learn more about \"Learning About Chronic Bronchitis. \"  Current as of: May 12, 2017  Content Version: 11.4  © 1236-2718 [employer]. Care instructions adapted under license by [employer] (which disclaims liability or warranty for this information). If you have questions about a medical condition or this instruction, always ask your healthcare professional. [address] any warranty or liability for your use of this information.     Guaifenesin (By mouth)   Guaifenesin (yzbo-OUM-s-sin)  Thins mucus so you can clear it from your head, throat, and lungs. Brand Name(s): Allfen, Chest Congestion Relief, Children's Mucinex, Children's Mucus Relief, Cough, Diabetic Siltussin COBIAN-Na, Diabetic Tussin, Equate Mucus ER, [facility] Children's Mucus Relief, [facility] Mucus ER, [facility] Mucus Relief, [facility] Tab Tussin, [address], [address] [facility] Chest Congestion   There may be other brand names for this medicine. When This Medicine Should Not Be Used: You should not use this medicine if you have ever had an allergic reaction to guaifenesin. Do not give any over-the-counter (OTC) cough and cold medicine to a baby or child under 3years old. Using these medicines in very young children might cause serious or possibly life-threatening side effects. How to Use This Medicine:   Capsule, Tablet, Long Acting Capsule, Long Acting Tablet, Liquid  · Your doctor will tell you how much medicine to use. Do not use more than directed. · Follow the instructions on the medicine label if you are using this medicine without a prescription. · Measure the oral liquid medicine with a marked measuring spoon, oral syringe, or medicine cup. · Do not break, chew, or crush the tablet or capsule. Swallow it whole with a full glass of water. If a dose is missed:   · You must use this medicine on a fixed schedule. Call your doctor or pharmacist if you miss a dose. How to Store and Dispose of This Medicine:   · Store the medicine in a closed container at room temperature, away from heat, moisture, and direct light. Do not freeze the oral liquid. · Ask your pharmacist, doctor, or health caregiver about the best way to dispose of any outdated medicine or medicine no longer needed. · Keep all medicine out of the reach of children. Never share your medicine with anyone.   Drugs and Foods to Avoid:      Ask your doctor or pharmacist before using any other medicine, including over-the-counter medicines, vitamins, and herbal products. Warnings While Using This Medicine:   · If you are pregnant or breast feeding, ask your doctor or pharmacist before using this medicine. · If your cough is caused by asthma, emphysema, bronchitis, or smoking, talk to your doctor before using this medicine. · This medicine may contain alcohol. Possible Side Effects While Using This Medicine:   Call your doctor right away if you notice any of these side effects:  · Cough that lasts longer than 7 days. · Cough with fever, skin rash, or ongoing headache. If you notice these less serious side effects, talk with your doctor:   · Drowsiness. · Nausea, vomiting, diarrhea, stomach pain. If you notice other side effects that you think are caused by this medicine, tell your doctor. Call your doctor for medical advice about side effects. You may report side effects to FDA at 9-892-FDA-3022  © 2017 [address] Information is for End User's use only and may not be sold, redistributed or otherwise used for commercial purposes. The above information is an  only. It is not intended as medical advice for individual conditions or treatments. Talk to your doctor, nurse or pharmacist before following any medical regimen to see if it is safe and effective for you.

## 2018-02-06 ENCOUNTER — TELEPHONE (OUTPATIENT)
Dept: FAMILY MEDICINE CLINIC | Age: 53
End: 2018-02-06

## 2018-02-08 ENCOUNTER — OFFICE VISIT (OUTPATIENT)
Dept: FAMILY MEDICINE CLINIC | Age: 53
End: 2018-02-08

## 2018-02-08 DIAGNOSIS — J40 BRONCHITIS: ICD-10-CM

## 2018-02-08 RX ORDER — ALBUTEROL SULFATE 90 UG/1
2 AEROSOL, METERED RESPIRATORY (INHALATION)
Qty: 1 INHALER | Refills: 0 | Status: SHIPPED | OUTPATIENT
Start: 2018-02-08 | End: 2020-11-03

## 2018-02-08 RX ORDER — LEVOFLOXACIN 500 MG/1
500 TABLET, FILM COATED ORAL DAILY
Qty: 7 TAB | Refills: 0 | Status: SHIPPED | OUTPATIENT
Start: 2018-02-08 | End: 2018-02-15

## 2018-02-08 RX ORDER — PREDNISONE 20 MG/1
40 TABLET ORAL DAILY
Qty: 10 TAB | Refills: 0
Start: 2018-02-08 | End: 2018-02-10 | Stop reason: SDUPTHER

## 2018-02-08 NOTE — PROGRESS NOTES
Subjective    Wicho Lopez is an 47 y.o. male who presents with cc of unresolving cought. PMHx of chronic bronchitis and season allergies.      Pt was seen in clinic 1/23 with bronchitis after completing z-aakash, short course of prednisone and albuterol inhaler. Chest xray was completed and did not show acute changed. He was states that OTC management and albuterol inhaler did not resolve the cough. Dry cough. Effecting driving and his use of CPAP for MARCIAL. Without fever, sore throat, SOB, CP, N/V/D, syncope. No sick contacts. Asking for pulm referral.     No smoking. Allergies - reviewed:   No Known Allergies      Medications - reviewed:   Current Outpatient Prescriptions   Medication Sig    predniSONE (DELTASONE) 20 mg tablet Take 2 Tabs by mouth daily.  Omega-3 Fatty Acids (FISH OIL) 500 mg cap Take  by mouth daily.  levoFLOXacin (LEVAQUIN) 500 mg tablet Take 1 Tab by mouth daily for 7 days.  albuterol (PROVENTIL HFA, VENTOLIN HFA, PROAIR HFA) 90 mcg/actuation inhaler Take 2 Puffs by inhalation every four (4) hours as needed for Wheezing.  omeprazole (PRILOSEC) 20 mg capsule Take 20 mg by mouth daily.  Omeprazole delayed release (PRILOSEC D/R) 20 mg tablet Take 20 mg by mouth daily.  GINKGO BILOBA (GINKOBA PO) Take  by mouth.  MULTIVITAMIN PO Take  by mouth. No current facility-administered medications for this visit.           Past Medical History - reviewed:  Past Medical History:   Diagnosis Date    BPH (benign prostatic hyperplasia)     Calculus of kidney     Diverticulosis     H/O seasonal allergies     Hiatal hernia     Obstructive sleep apnea     CPAP         Past Surgical History - reviewed:   Past Surgical History:   Procedure Laterality Date    COLONOSCOPY N/A 2/13/2017    COLONOSCOPY performed by Caio [facility]la [facility] at [facility] 59 HX APPENDECTOMY      [facility]     history of vasectomy    [place] TRABECULOPLASTY BY LASER SURGERY      MICHELLE    TRANSURETHRAL INCISION OF PROSTATE           Social History - reviewed:  Social History     Social History    Marital status:      Spouse name: N/A    Number of children: N/A    Years of education: N/A     Occupational History    Not on file. Social History Main Topics    Smoking status: Never Smoker    Smokeless tobacco: Never Used    Alcohol use 1.0 oz/week     2 Cans of beer per week    Drug use: No    Sexual activity: Yes     Partners: Female     Other Topics Concern    Not on file     Social History Narrative         Family History - reviewed:  Family History   Problem Relation Age of Onset    Other Mother      Parkinson's Disease    Heart Disease Father      heart valve defect    Heart Attack Father       at 40 of ? defect    Diabetes Maternal Grandfather     Stroke Maternal Grandfather     Kidney Disease Maternal Grandfather     Asthma Brother          Immunizations - reviewed:   Immunization History   Administered Date(s) Administered    Influenza Vaccine 2015    Influenza Vaccine (Quad) PF 2016, 2017    Tdap 2015       ROS  Constitutional: Negative.  Negative for appetite change, fever and unexpected weight change. HENT: Negative for sore throat, congestion, ear pain, hearing loss, nosebleeds, rhinorrhea and trouble swallowing.    Respiratory: Positive for cough. Negative for chest tightness.    Cardiovascular:  Negative for chest pain and palpitations. Gastrointestinal: Negative for abdominal distention, abdominal pain, blood in stool and vomiting. Neurological: Negative.  Negative for dizziness, syncope, weakness and numbness. Erika Blount Physical Exam  Visit Vitals    /79 (BP 1 Location: Left arm, BP Patient Position: Sitting)    Pulse 69    Temp 98 °F (36.7 °C) (Oral)    Resp 18    Ht 5' 10\" (1.778 m)    Wt 192 lb (87.1 kg)    SpO2 95%    BMI 27.55 kg/m2       Constitutional: NAD. Not fatigued. Dry cough.  Speaking in full sentence. Cardiovascular: Normal rate, regular rhythm, normal heart sounds and intact distal pulses. No murmur heard. Pulmonary/Chest: Effort normal and breath sounds normal. No respiratory distress. He has wheezes throughout. Abdominal: Soft. Bowel sounds are normal. He exhibits no distension. There is no tenderness. Musculoskeletal: Normal range of motion. He exhibits no edema. Neurological: He is alert and oriented to person, place, and time. No cranial nerve deficit. Skin: Skin is warm and dry. No rash noted. Psychiatric: He has a normal mood and affect. His behavior is normal. Thought content normal.     Assessment/Plan    ICD-10-CM ICD-9-CM    1. Bronchitis J40 490 levoFLOXacin (LEVAQUIN) 500 mg tablet      REFERRAL TO PULMONARY DISEASE      albuterol (PROVENTIL HFA, VENTOLIN HFA, PROAIR HFA) 90 mcg/actuation inhaler     Pt with sats 95% on RA. Dry cough noted. Wheezes noted on lung exam. Perhaps pt is having an exacerbation 2/2 to weather change but not improved with OTC treatment and short steroid burst. Will given Levaquin to cover potential bactieral respiratory infection, pt competed z-aakash earlier 1/2018. Pt to continue albuterol q4h and mucinex. Referred to pulmonology for further management. Pt advised to seek medical attention if develops CP, SOB, fever, blood in sputum. Precautions given. Pt understands plan and agreed with it. Follow-up Disposition:  Return in about 1 week (around 2/15/2018). I have discussed the diagnosis with the patient and the intended plan as seen in the above orders. Patient verbalized understanding of the plan and agrees with the plan. The patient has received an after-visit summary and questions were answered concerning future plans. I have discussed medication side effects and warnings with the patient as well. Informed patient to return to the office if new symptoms arise.         Lopez Perez DO  Family Medicine Resident

## 2018-02-08 NOTE — PROGRESS NOTES
Chief Complaint   Patient presents with    Follow-up    Cough     Patient presents today for a follow up from [address] [place] [address] Rd 1/23. Patient continues to experience a dry cough, wheezing and shortness of breath at times. Patient is unable to sleep or CPAP machine due to coughing at night. Using OTC cough supressant. Visit Vitals    BP (!) 140/99 (BP 1 Location: Left arm, BP Patient Position: Sitting)    Pulse 69    Temp 98 °F (36.7 °C) (Oral)    Resp 18    Ht 5' 10\" (1.778 m)    Wt 192 lb (87.1 kg)    SpO2 95%    BMI 27.55 kg/m2       1. Have you been to the ER, urgent care clinic since your last visit? Hospitalized since your last visit? No    2. Have you seen or consulted any other health care providers outside of the [address] [place] [address] since your last visit? Include any pap smears or colon screening.  No

## 2018-02-08 NOTE — PATIENT INSTRUCTIONS

## 2018-02-08 NOTE — TELEPHONE ENCOUNTER
Spoke with  Kayli Villela, states he has appointment today, requesting referral to pulmonary specialist regarding constant cough. Mr. Kayli Villela states it needs to be some place that accepts .

## 2018-02-10 VITALS
DIASTOLIC BLOOD PRESSURE: 79 MMHG | RESPIRATION RATE: 18 BRPM | BODY MASS INDEX: 27.49 KG/M2 | WEIGHT: 192 LBS | HEART RATE: 69 BPM | SYSTOLIC BLOOD PRESSURE: 140 MMHG | OXYGEN SATURATION: 95 % | TEMPERATURE: 98 F | HEIGHT: 70 IN

## 2018-02-10 RX ORDER — PREDNISONE 20 MG/1
40 TABLET ORAL DAILY
Qty: 10 TAB | Refills: 0
Start: 2018-01-23 | End: 2020-11-03

## 2018-02-13 NOTE — TELEPHONE ENCOUNTER
----- Message from Ema Caldwell sent at 2/13/2018  2:03 PM EST -----  Regarding: Darrin Perez with the office of Dr Alejandra San is requesting a referral.Pt has an appointment on 2/19/18 at 10:15am for coughing. Bridgett Keith number is [phone] and fax [phone]. New Pt appointment for them.

## 2018-11-16 ENCOUNTER — OFFICE VISIT (OUTPATIENT)
Dept: FAMILY MEDICINE CLINIC | Age: 53
End: 2018-11-16

## 2018-11-16 VITALS
RESPIRATION RATE: 16 BRPM | WEIGHT: 191.8 LBS | DIASTOLIC BLOOD PRESSURE: 77 MMHG | BODY MASS INDEX: 27.46 KG/M2 | HEART RATE: 79 BPM | SYSTOLIC BLOOD PRESSURE: 123 MMHG | TEMPERATURE: 98.1 F | OXYGEN SATURATION: 98 % | HEIGHT: 70 IN

## 2018-11-16 DIAGNOSIS — Z23 ENCOUNTER FOR IMMUNIZATION: ICD-10-CM

## 2018-11-16 DIAGNOSIS — Z00.00 PHYSICAL EXAM: Primary | ICD-10-CM

## 2018-11-16 NOTE — PATIENT INSTRUCTIONS

## 2018-11-16 NOTE — PROGRESS NOTES
Subjective: Virginia Rosas is an 47 y.o. male who presents for complete physical exam.    Concerns today:  -No acute concerns today. Chronic medical problems:   1. Chronic bronchitis. Not in acute flare. 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 Had flare in 2/2018 when he required PO steroids and antibiotics. He was evaluated by pulmonologist, PFTs were done which were normal ( per patient). He is not on any daily medications, uses Albuterol prn. Never smoked. 2. MARCIAL. Had evaluation many years ago. Currently on CPAP at night, the mas was adjusted in 7/2018        Diet: Well balanced, primarily home food. Wife cooks at home, plenty of salads/veggies, chicken and fish, red meat not very often. Exercise: Going to GYM at least 3 times a week,sometimes every day. Allergies - reviewed:   No Known Allergies      Medications - reviewed:  Current Outpatient Medications   Medication Sig    Omega-3 Fatty Acids (FISH OIL) 500 mg cap Take  by mouth daily.  omeprazole (PRILOSEC) 20 mg capsule Take 20 mg by mouth daily.  Omeprazole delayed release (PRILOSEC D/R) 20 mg tablet Take 20 mg by mouth daily.  GINKGO BILOBA (GINKOBA PO) Take  by mouth.  MULTIVITAMIN PO Take  by mouth.  predniSONE (DELTASONE) 20 mg tablet Take 2 Tabs by mouth daily.  albuterol (PROVENTIL HFA, VENTOLIN HFA, PROAIR HFA) 90 mcg/actuation inhaler Take 2 Puffs by inhalation every four (4) hours as needed for Wheezing. No current facility-administered medications for this visit.           Past Medical History - reviewed:  Past Medical History:   Diagnosis Date    BPH (benign prostatic hyperplasia)     Calculus of kidney     Diverticulosis     H/O seasonal allergies     Hiatal hernia     Obstructive sleep apnea     CPAP         Past Surgical History - reviewed:  Past Surgical History:   Procedure Laterality Date    HX APPENDECTOMY      Laura history of vasectomy    [place] TRABECULOPLASTY BY LASER SURGERY      LASIC    TRANSURETHRAL INCISION OF PROSTATE           Family History - reviewed:  Family History   Problem Relation Age of Onset    Other Mother         Parkinson's Disease    Heart Disease Father         heart valve defect    Heart Attack Father          at 40 of ? defect    Diabetes Maternal Grandfather     Stroke Maternal Grandfather     Kidney Disease Maternal Grandfather     Asthma Brother          Social History - reviewed:  Social History     Socioeconomic History    Marital status:      Spouse name: Not on file    Number of children: Not on file    Years of education: Not on file    Highest education level: Not on file   Social Needs    Financial resource strain: Not on file    Food insecurity - worry: Not on file    Food insecurity - inability: Not on file   [employer] needs - medical: Not on file   [employer] needs - non-medical: Not on file   Occupational History    Not on file   Tobacco Use    Smoking status: Never Smoker    Smokeless tobacco: Never Used   Substance and Sexual Activity    Alcohol use:  Yes     Alcohol/week: 1.0 oz     Types: 2 Cans of beer per week    Drug use: No    Sexual activity: Yes     Partners: Female   Other Topics Concern    Not on file   Social History Narrative    Not on file         Immunizations - reviewed:   Immunization History   Administered Date(s) Administered    Influenza Vaccine 2015    Influenza Vaccine (Quad) PF 2016, 2017, 2018    Tdap 2015       Zostervax: Script given today      Health Maintenance reviewed -  Colonoscopy 2 years ago   Hepatitis C testing 2016 _non reactive  Lung cancer screening Not indicated      Review of Systems   CONSTITUTIONAL: Denies: fever, chills  EYES: Denies: blurry vision, decreased vision  ENT: Denies: sore throat, nasal congestion, nasal discharge  CARDIOVASCULAR: Denies: chest pain, dyspnea on exertion, orthopnea  RESPIRATORY: Denies: cough, hemoptysis, shortness of breath  ENDOCRINE: Denies: polydipsia/polyuria, palpitations  GI: Denies: abdominal pain, nausea, vomiting  : Denies: dysuria, frequency/urgency  NEURO: Denies: dizzy/vertigo, headache  MUSCULOSKELETAL: Denies: back pain, joint pain  SKIN: Denies: rash, itching  PSYCH: Denies: anxiety, depression      Objective:     Visit Vitals  /77   Pulse 79   Temp 98.1 °F (36.7 °C) (Oral)   Resp 16   Ht 5' 10\" (1.778 m)   Wt 191 lb 12.8 oz (87 kg)   SpO2 98%   BMI 27.52 kg/m²       General appearance - alert, well appearing, and in no distress  Eyes - pupils equal and reactive, extraocular eye movements intact  Ears - bilateral TM's and external ear canals normal  Nose - normal and patent, no erythema, discharge or polyps  Mouth - mucous membranes moist, pharynx normal without lesions  Neck - supple, no significant adenopathy  Chest - clear to auscultation, no wheezes, rales or rhonchi, symmetric air entry  Heart - normal rate, regular rhythm, normal S1, S2, no murmurs, rubs, clicks or gallops  Abdomen - soft, nontender, nondistended, no masses or organomegaly  Neurological - alert, oriented, normal speech, no focal findings or movement disorder noted  Musculoskeletal - no joint tenderness, deformity or swelling  Extremities - peripheral pulses normal, no pedal edema, no clubbing or cyanosis  Skin - normal coloration and turgor, no rashes, no suspicious skin lesions noted      Assessment:   46 yo male who is here for:     ICD-10-CM ICD-9-CM    1. Physical exam Z00.00 V70.9 CBC W/O DIFF      METABOLIC PANEL, COMPREHENSIVE      LIPID PANEL      HEMOGLOBIN A1C WITH EAG      INFLUENZA VIRUS VAC QUAD,SPLIT,PRESV FREE SYRINGE IM   2.  Encounter for immunization Z23 V03.89 UT IMMUNIZ ADMIN,1 SINGLE/COMB VAC/TOXOID           Plan:   · Counseled re: diet, exercise, healthy lifestyle    · Appropriate labs, vaccines, imaging studies, and referrals ordered as listed above      · The patient was counseled on the dangers of tobacco use, and was advised to quit. Reviewed strategies to maximize success, including written materials. Follow-up Disposition:  Return if symptoms worsen or fail to improve. I have discussed the diagnosis with the patient and the intended plan as seen in the above orders. The patient has received an after-visit summary and questions were answered concerning future plans. I have discussed medication side effects and warnings with the patient as well. Informed pt to return to the office if new symptoms arise.       Cindi Shannon MD  Family Medicine Resident, PGY-3

## 2018-11-16 NOTE — PROGRESS NOTES
Chief Complaint   Patient presents with    Complete Physical     1. Have you been to the ER, urgent care clinic since your last visit? Hospitalized since your last visit? No    2. Have you seen or consulted any other health care providers outside of the [address] [place] [address] since your last visit? Include any pap smears or colon screening.  No     Colonoscopy---2 years ago--diverticulosis removed some polyps    Hiatal hernia    Encounter for flu vaccine

## 2018-11-17 LAB
ALBUMIN SERPL-MCNC: 4.5 G/DL (ref 3.5–5.5)
ALBUMIN/GLOB SERPL: 1.7 {RATIO} (ref 1.2–2.2)
ALP SERPL-CCNC: 72 IU/L (ref 39–117)
ALT SERPL-CCNC: 27 IU/L (ref 0–44)
AST SERPL-CCNC: 20 IU/L (ref 0–40)
BILIRUB SERPL-MCNC: 0.5 MG/DL (ref 0–1.2)
BUN SERPL-MCNC: 16 MG/DL (ref 6–24)
BUN/CREAT SERPL: 16 (ref 9–20)
CALCIUM SERPL-MCNC: 10.2 MG/DL (ref 8.7–10.2)
CHLORIDE SERPL-SCNC: 102 MMOL/L (ref 96–106)
CHOLEST SERPL-MCNC: 218 MG/DL (ref 100–199)
CO2 SERPL-SCNC: 26 MMOL/L (ref 20–29)
CREAT SERPL-MCNC: 1.03 MG/DL (ref 0.76–1.27)
ERYTHROCYTE [DISTWIDTH] IN BLOOD BY AUTOMATED COUNT: 13.9 % (ref 12.3–15.4)
EST. AVERAGE GLUCOSE BLD GHB EST-MCNC: 108 MG/DL
GLOBULIN SER CALC-MCNC: 2.7 G/DL (ref 1.5–4.5)
GLUCOSE SERPL-MCNC: 88 MG/DL (ref 65–99)
HBA1C MFR BLD: 5.4 % (ref 4.8–5.6)
HCT VFR BLD AUTO: 47.8 % (ref 37.5–51)
HDLC SERPL-MCNC: 46 MG/DL
HGB BLD-MCNC: 16.7 G/DL (ref 13–17.7)
INTERPRETATION, 910389: NORMAL
LDLC SERPL CALC-MCNC: 118 MG/DL (ref 0–99)
MCH RBC QN AUTO: 29 PG (ref 26.6–33)
MCHC RBC AUTO-ENTMCNC: 34.9 G/DL (ref 31.5–35.7)
MCV RBC AUTO: 83 FL (ref 79–97)
PLATELET # BLD AUTO: 223 X10E3/UL (ref 150–379)
POTASSIUM SERPL-SCNC: 4.9 MMOL/L (ref 3.5–5.2)
PROT SERPL-MCNC: 7.2 G/DL (ref 6–8.5)
RBC # BLD AUTO: 5.76 X10E6/UL (ref 4.14–5.8)
SODIUM SERPL-SCNC: 143 MMOL/L (ref 134–144)
TRIGL SERPL-MCNC: 268 MG/DL (ref 0–149)
VLDLC SERPL CALC-MCNC: 54 MG/DL (ref 5–40)
WBC # BLD AUTO: 6.1 X10E3/UL (ref 3.4–10.8)

## 2018-11-21 NOTE — PROGRESS NOTES
CBC with Hg 16.7, CMP WNL, HgA1C 5.4, lipid panel  With , , HDL 46 and . 10 year ASCVD risk is 4.8%, not in statin benefit group. Will recommend life style  Modifications. Will call the pt.

## 2019-05-02 ENCOUNTER — OFFICE VISIT (OUTPATIENT)
Dept: FAMILY MEDICINE CLINIC | Age: 54
End: 2019-05-02

## 2019-05-02 VITALS
HEIGHT: 70 IN | TEMPERATURE: 98 F | HEART RATE: 65 BPM | WEIGHT: 194 LBS | BODY MASS INDEX: 27.77 KG/M2 | OXYGEN SATURATION: 99 % | DIASTOLIC BLOOD PRESSURE: 85 MMHG | SYSTOLIC BLOOD PRESSURE: 138 MMHG | RESPIRATION RATE: 16 BRPM

## 2019-05-02 DIAGNOSIS — H10.32 ACUTE CONJUNCTIVITIS OF LEFT EYE, UNSPECIFIED ACUTE CONJUNCTIVITIS TYPE: Primary | ICD-10-CM

## 2019-05-02 RX ORDER — POLYMYXIN B SULFATE AND TRIMETHOPRIM 1; 10000 MG/ML; [USP'U]/ML
1 SOLUTION OPHTHALMIC 4 TIMES DAILY
Qty: 1 BOTTLE | Refills: 0 | Status: SHIPPED | OUTPATIENT
Start: 2019-05-02 | End: 2020-11-03

## 2019-05-02 NOTE — PATIENT INSTRUCTIONS
Pinkeye: Care Instructions Your Care Instructions Pinkeye is redness and swelling of the eye surface and the conjunctiva (the lining of the eyelid and the covering of the white part of the eye). Pinkeye is also called conjunctivitis. Pinkeye is often caused by infection with bacteria or a virus. Dry air, allergies, smoke, and chemicals are other common causes. Pinkeye often clears on its own in 7 to 10 days. Antibiotics only help if the pinkeye is caused by bacteria. Pinkeye caused by infection spreads easily. If an allergy or chemical is causing pinkeye, it will not go away unless you can avoid whatever is causing it. Follow-up care is a key part of your treatment and safety. Be sure to make and go to all appointments, and call your doctor if you are having problems. It's also a good idea to know your test results and keep a list of the medicines you take. How can you care for yourself at home? · Wash your hands often. Always wash them before and after you treat pinkeye or touch your eyes or face. · Use moist cotton or a clean, wet cloth to remove crust. Wipe from the inside corner of the eye to the outside. Use a clean part of the cloth for each wipe. · Put cold or warm wet cloths on your eye a few times a day if the eye hurts. · Do not wear contact lenses or eye makeup until the pinkeye is gone. Throw away any eye makeup you were using when you got pinkeye. Clean your contacts and storage case. If you wear disposable contacts, use a new pair when your eye has cleared and it is safe to wear contacts again. · If the doctor gave you antibiotic ointment or eyedrops, use them as directed. Use the medicine for as long as instructed, even if your eye starts looking better soon. Keep the bottle tip clean, and do not let it touch the eye area. · To put in eyedrops or ointment: ? Tilt your head back, and pull your lower eyelid down with one finger. ? Drop or squirt the medicine inside the lower lid. ? Close your eye for 30 to 60 seconds to let the drops or ointment move around. ? Do not touch the ointment or dropper tip to your eyelashes or any other surface. · Do not share towels, pillows, or washcloths while you have pinkeye. When should you call for help? Call your doctor now or seek immediate medical care if: 
  · You have pain in your eye, not just irritation on the surface.  
  · You have a change in vision or loss of vision.  
  · You have an increase in discharge from the eye.  
  · Your eye has not started to improve or begins to get worse within 48 hours after you start using antibiotics.  
  · Pinkeye lasts longer than 7 days.  
 Watch closely for changes in your health, and be sure to contact your doctor if you have any problems. Where can you learn more? Go to http://aris-gilma.info/. Enter Y392 in the search box to learn more about \"Pinkeye: Care Instructions. \" Current as of: September 23, 2018 Content Version: 11.9 © 8920-9626 [employer]. Care instructions adapted under license by [employer] (which disclaims liability or warranty for this information). If you have questions about a medical condition or this instruction, always ask your healthcare professional. [address] any warranty or liability for your use of this information.

## 2019-05-02 NOTE — PROGRESS NOTES
QUINN Mazariegos is a 47 y.o. male who presents redness and irritation of left eye It started 2 days ago. Patient says it started out as just tearing up on that eye but it then started to get red and every morning he has a yellow discharge in his left eye. He also is having some mild pain in the left eye. No complaint on the right eye Denies itchiness in the eye, blurry vision, photophobia, fever, chills. He denies any upper respiratory symptoms. No sick contacts PMHx-reviewed: 
Past Medical History:  
Diagnosis Date  BPH (benign prostatic hyperplasia)  Calculus of kidney  Diverticulosis  H/O seasonal allergies  Hiatal hernia  Obstructive sleep apnea CPAP Meds-reviewed:  
Current Outpatient Medications Medication Sig Dispense Refill  trimethoprim-polymyxin b (POLYTRIM) ophthalmic solution Administer 1 Drop to left eye four (4) times daily. 1 Bottle 0  
 Omega-3 Fatty Acids (FISH OIL) 500 mg cap Take  by mouth daily.  albuterol (PROVENTIL HFA, VENTOLIN HFA, PROAIR HFA) 90 mcg/actuation inhaler Take 2 Puffs by inhalation every four (4) hours as needed for Wheezing. 1 Inhaler 0  
 omeprazole (PRILOSEC) 20 mg capsule Take 20 mg by mouth daily.  Omeprazole delayed release (PRILOSEC D/R) 20 mg tablet Take 20 mg by mouth daily.  GINKGO BILOBA (GINKOBA PO) Take  by mouth.  MULTIVITAMIN PO Take  by mouth.  predniSONE (DELTASONE) 20 mg tablet Take 2 Tabs by mouth daily. 10 Tab 0 Allergies-reviewed:  
No Known Allergies Smoker-reviewed: 
Social History Tobacco Use Smoking Status Never Smoker Smokeless Tobacco Never Used ETOH-reviewed:  
Social History Substance and Sexual Activity Alcohol Use Yes  Alcohol/week: 1.0 oz  Types: 2 Cans of beer per week FH-reviewed:  
Family History Problem Relation Age of Onset 25 [facility] Other Mother Parkinson's Disease  Heart Disease Father heart valve defect  Heart Attack Father   
      at 40 of ? defect  Diabetes Maternal Grandfather  Stroke Maternal Grandfather  Kidney Disease Maternal Grandfather  Asthma Brother ROS: 
Review of Systems Constitutional: Negative. HENT: Negative. Eyes: Positive for pain, discharge, redness and itching. Negative for photophobia and visual disturbance. Respiratory: Negative. Cardiovascular: Negative. Skin: Negative. Physical Exam: 
Visit Vitals /85 Pulse 65 Temp 98 °F (36.7 °C) (Oral) Resp 16 Ht 5' 10\" (1.778 m) Wt 194 lb (88 kg) SpO2 99% BMI 27.84 kg/m² Wt Readings from Last 3 Encounters:  
19 194 lb (88 kg)  
18 191 lb 12.8 oz (87 kg) 18 192 lb (87.1 kg) BP Readings from Last 3 Encounters:  
19 138/85  
18 123/77  
18 140/79 Physical Exam  
Constitutional: He appears well-developed and well-nourished. No distress. Eyes: Pupils are equal, round, and reactive to light. EOM and lids are normal. Right eye exhibits no discharge. No foreign body present in the right eye. Left eye exhibits no discharge. No foreign body present in the left eye. Right conjunctiva is not injected. Right conjunctiva has no hemorrhage. Left conjunctiva is injected. Left conjunctiva has no hemorrhage. Cardiovascular: Normal rate, regular rhythm and normal heart sounds. No murmur heard. Pulmonary/Chest: Effort normal and breath sounds normal. He has no wheezes. He has no rales. Assessment 47 y.o. male with: 
  ICD-10-CM ICD-9-CM 1. Acute conjunctivitis of left eye, unspecified acute conjunctivitis type H10.32 372.00 trimethoprim-polymyxin b (POLYTRIM) ophthalmic solution Plan Orders Placed This Encounter  trimethoprim-polymyxin b (POLYTRIM) ophthalmic solution  
 
- Conjunctivitis could be viral in etiology but due to symptoms will treat with Polytrim ophthalmic solution. Do 1-2 drops 4 times a day for 5-7 days. - Can apply warm compresses as needed. - Encourage to wash his hands always - Return if symptoms fail to improve or worsen Patient discussed with Dr. Srinivasa Rojas 
 
I have discussed the diagnosis with the patient and the intended plan as seen in the above orders. The patient has received an after-visit summary and questions were answered concerning future plans. I have discussed medication side effects and warnings with the patient as well. Cathleen Farrell MD 
Family Medicine Resident

## 2019-07-08 ENCOUNTER — TELEPHONE (OUTPATIENT)
Dept: FAMILY MEDICINE CLINIC | Age: 54
End: 2019-07-08

## 2019-07-08 DIAGNOSIS — G47.30 SLEEP APNEA, UNSPECIFIED TYPE: Primary | ICD-10-CM

## 2019-07-08 NOTE — TELEPHONE ENCOUNTER
----- Message from Curt Wilcox sent at 7/8/2019  9:38 AM EDT -----  Regarding: Dr. Grady Mcgraw is requesting a referral sent to [facility] for \"Annuel Sleep Apnea F/u\". Fax [phone], Phone [phone]. Pt appointment is 07/16/19 at 8:30.   Pt's best contact is [phone]

## 2019-07-08 NOTE — TELEPHONE ENCOUNTER
Dr Oreilly Royalty: Will you enter an order into [facility] for a Pulmonology consult for this patient. ... (Dx Code: G47.33). ... Patient has an appointment on 7/16/19 @ 8:30 am.... Any questions, call me. ...     Thanks   [employer] B

## 2019-12-03 ENCOUNTER — OFFICE VISIT (OUTPATIENT)
Dept: FAMILY MEDICINE CLINIC | Age: 54
End: 2019-12-03

## 2019-12-03 VITALS
HEIGHT: 70 IN | DIASTOLIC BLOOD PRESSURE: 85 MMHG | BODY MASS INDEX: 28.2 KG/M2 | HEART RATE: 71 BPM | OXYGEN SATURATION: 97 % | SYSTOLIC BLOOD PRESSURE: 133 MMHG | TEMPERATURE: 98.7 F | WEIGHT: 197 LBS

## 2019-12-03 DIAGNOSIS — E78.5 DYSLIPIDEMIA: ICD-10-CM

## 2019-12-03 DIAGNOSIS — G47.33 OSA (OBSTRUCTIVE SLEEP APNEA): ICD-10-CM

## 2019-12-03 DIAGNOSIS — K44.9 HIATAL HERNIA: ICD-10-CM

## 2019-12-03 DIAGNOSIS — K57.90 DIVERTICULOSIS: ICD-10-CM

## 2019-12-03 DIAGNOSIS — Z23 ENCOUNTER FOR IMMUNIZATION: ICD-10-CM

## 2019-12-03 DIAGNOSIS — N40.0 BENIGN PROSTATIC HYPERPLASIA WITHOUT LOWER URINARY TRACT SYMPTOMS: ICD-10-CM

## 2019-12-03 DIAGNOSIS — Z00.00 VISIT FOR WELL MAN HEALTH CHECK: Primary | ICD-10-CM

## 2019-12-03 NOTE — PROGRESS NOTES
[address] [place] [address]    Subjective: Ramiro Rascon is a 49 y.o. male with history of see problem list  CC: complete physical  History provided by patient and chart review    HPI:  Here to today for complete physical. Has history of flat feet worse in on his left foot. States he would like to be examined by a podiatrist for custom insolves. Has intermittent ankle pain which he attributes to his flat feet. No current symptoms. History of Hiatal Hernia: Currently on daily omeprazole and denies any reflux symptoms. Followed by GI. MARCIAL: Diagnosed in 2011. Compliant with daily CPAP use. BPH: Diagnosed in 2007. Had transurethral incision in 2008. Denies any urinary symptoms. Hx of uncle with prostate cancer. Not followed regularly by urologist.  Diverticulosis: Seen on colonoscopy 5 yrs ago. No diarrhea, abdominal pain, constipation, brbpr or melena. Dyslipidemia: Working on improving his diet and exercising. Health Maintiance: UTD on shingles. Done at [facility]. Influenza today. Had colonoscopy 5 yrs ago with polypectomy. Nect due in 5 yrs. No tobacco use. Drinks socially. No illicit drug use. Past Medical History:   Diagnosis Date    BPH (benign prostatic hyperplasia)     Calculus of kidney     Diverticulosis     H/O seasonal allergies     Hiatal hernia     Obstructive sleep apnea     CPAP     No Known Allergies  Current Outpatient Medications on File Prior to Visit   Medication Sig Dispense Refill    omeprazole (PRILOSEC) 20 mg capsule Take 20 mg by mouth daily.  Omeprazole delayed release (PRILOSEC D/R) 20 mg tablet Take 20 mg by mouth daily.  trimethoprim-polymyxin b (POLYTRIM) ophthalmic solution Administer 1 Drop to left eye four (4) times daily. 1 Bottle 0    predniSONE (DELTASONE) 20 mg tablet Take 2 Tabs by mouth daily. 10 Tab 0    Omega-3 Fatty Acids (FISH OIL) 500 mg cap Take  by mouth daily.       albuterol (PROVENTIL HFA, VENTOLIN HFA, PROAIR HFA) 90 mcg/actuation inhaler Take 2 Puffs by inhalation every four (4) hours as needed for Wheezing. 1 Inhaler 0    GINKGO BILOBA (GINKOBA PO) Take  by mouth.  MULTIVITAMIN PO Take  by mouth. No current facility-administered medications on file prior to visit. Family History   Problem Relation Age of Onset    Other Mother         Parkinson's Disease    Heart Disease Father         heart valve defect    Heart Attack Father          at 40 of ? defect    Diabetes Maternal Grandfather     Stroke Maternal Grandfather     Kidney Disease Maternal Grandfather     Asthma Brother      Social History     Socioeconomic History    Marital status:      Spouse name: Not on file    Number of children: Not on file    Years of education: Not on file    Highest education level: Not on file   Tobacco Use    Smoking status: Never Smoker    Smokeless tobacco: Never Used   Substance and Sexual Activity    Alcohol use: Yes     Alcohol/week: 1.7 standard drinks     Types: 2 Cans of beer per week    Drug use: No    Sexual activity: Yes     Partners: Female     Past Surgical History:   Procedure Laterality Date    COLONOSCOPY N/A 2017    [facility] HX APPENDECTOMY      [facility]     history of vasectomy    [place] TRABECULOPLASTY BY LASER SURGERY      LASIC    TRANSURETHRAL INCISION OF PROSTATE         Patient Active Problem List   Diagnosis Code    Sleep apnea G47.30    History of BPH Z87.438    S/P colonoscopy Z98.890    Diverticulosis K57.90    Polyp of transverse colon D12.3    Sliding hiatal hernia K44.9    Dyslipidemia E78.5     Review of Systems   Constitutional: Negative for chills and fever. Respiratory: Negative for shortness of breath. Cardiovascular: Negative for chest pain, palpitations, orthopnea and leg swelling. Gastrointestinal: Negative for abdominal pain, nausea and vomiting.    Musculoskeletal: Negative for back pain, joint pain and myalgias. Skin: Negative for rash. Objective:     Visit Vitals  /85   Pulse 71   Temp 98.7 °F (37.1 °C)   Ht 5' 10\" (1.778 m)   Wt 197 lb (89.4 kg)   SpO2 97%   BMI 28.27 kg/m²        Physical Exam  Constitutional:       General: He is not in acute distress. Appearance: Normal appearance. HENT:      Head: Normocephalic and atraumatic. Right Ear: Tympanic membrane normal.      Left Ear: Tympanic membrane normal.      Nose: Nose normal.      Mouth/Throat:      Mouth: Mucous membranes are moist.      Pharynx: No oropharyngeal exudate or posterior oropharyngeal erythema. Eyes:      Extraocular Movements: Extraocular movements intact. Conjunctiva/sclera: Conjunctivae normal.      Pupils: Pupils are equal, round, and reactive to light. Neck:      Musculoskeletal: Normal range of motion and neck supple. No neck rigidity or muscular tenderness. Cardiovascular:      Rate and Rhythm: Normal rate and regular rhythm. Pulses: Normal pulses. Heart sounds: Normal heart sounds. Pulmonary:      Effort: Pulmonary effort is normal. No respiratory distress. Breath sounds: Normal breath sounds. No wheezing. Chest:      Chest wall: No tenderness. Abdominal:      General: Bowel sounds are normal. There is no distension. Palpations: Abdomen is soft. There is no mass. Tenderness: There is no tenderness. Musculoskeletal: Normal range of motion. General: No swelling or tenderness. Skin:     General: Skin is warm and dry. Neurological:      General: No focal deficit present. Mental Status: He is alert and oriented to person, place, and time. Mental status is at baseline. Cranial Nerves: No cranial nerve deficit. Psychiatric:         Mood and Affect: Mood normal.         Behavior: Behavior normal.         Thought Content:  Thought content normal.         Pertinent Labs/Studies:      Assessment and orders: Diagnoses and all orders for this visit:      1) MARCIAL (obstructive sleep apnea)   - Stable. Continue with qhs CPAP. Sleep medicine f/u as needed    2) Encounter for immunization  -     MS IMMUNIZ ADMIN,1 SINGLE/COMB VAC/TOXOID  -     INFLUENZA VIRUS VAC QUAD,SPLIT,PRESV FREE SYRINGE IM    3) Visit for well man health check         -     See # 2 above. Colonoscopy in 5 yrs. UTD on immunizations. 4) Diverticulosis        -    High Fiber diet    5) Benign prostatic hyperplasia without lower urinary tract symptoms        -     Currently asymptomatic. Repeat PSA Today  -     PSA W/ REFLX FREE PSA; Future    6) Dyslipidemia  -     LIPID PANEL; Future    7) Hiatal hernia        -     Stable. Continue Omeprazole. Continue with GI f/u. I have reviewed patient medical and social history and medications. I have reviewed pertinent labs results and other data. I have discussed the diagnosis with the patient and the intended plan as seen in the above orders. The patient has received an after-visit summary and questions were answered concerning future plans. I have discussed medication side effects and warnings with the patient as well.     Anton Willis MD  Resident [address] [facility] [address] [address] [place]  12/03/19    Patient discussed with Dr. Amy Bose, Attending Physician

## 2019-12-03 NOTE — PROGRESS NOTES
Chief Complaint   Patient presents with    Complete Physical     Seeking an referral to foot doctor due to complications due to flat feet

## 2019-12-04 ENCOUNTER — HOSPITAL ENCOUNTER (OUTPATIENT)
Dept: LAB | Age: 54
Discharge: HOME | End: 2019-12-04

## 2019-12-04 DIAGNOSIS — E78.5 DYSLIPIDEMIA: ICD-10-CM

## 2019-12-04 DIAGNOSIS — N40.0 BENIGN PROSTATIC HYPERPLASIA WITHOUT LOWER URINARY TRACT SYMPTOMS: ICD-10-CM

## 2019-12-04 LAB
CHOLEST SERPL-MCNC: 184 MG/DL
HDLC SERPL-MCNC: 40 MG/DL
HDLC SERPL: 4.6 {RATIO} (ref 0–5)
LDLC SERPL CALC-MCNC: 114 MG/DL (ref 0–100)
LIPID PROFILE,FLP: ABNORMAL
TRIGL SERPL-MCNC: 150 MG/DL (ref ?–150)
VLDLC SERPL CALC-MCNC: 30 MG/DL

## 2019-12-05 LAB
PSA SERPL-MCNC: 1 NG/ML (ref 0–4)
REFLEX CRITERIA: NORMAL

## 2019-12-16 NOTE — PROGRESS NOTES
Unable to reach via phone. Left voicemail. PSA normal. LDL mildly elevated. No indication for statin. Recommend low fat diet and exercise.

## 2020-01-02 ENCOUNTER — TELEPHONE (OUTPATIENT)
Dept: FAMILY MEDICINE CLINIC | Age: 55
End: 2020-01-02

## 2020-01-02 DIAGNOSIS — L84 FOOT CALLUS: Primary | ICD-10-CM

## 2020-01-02 NOTE — TELEPHONE ENCOUNTER
[phone]    Patient called for lab results of visit of 12/3.     Also asked if the doctor ordered his podiatry referral.

## 2020-02-25 ENCOUNTER — TELEPHONE (OUTPATIENT)
Dept: FAMILY MEDICINE CLINIC | Age: 55
End: 2020-02-25

## 2020-02-25 NOTE — TELEPHONE ENCOUNTER
Patient calling to relay to Bernadine/referrals that he need separate referral for Orthotics. Notes this would be an extension to previous referral and that you already have that information.     He also stated to use code       Pt ph [phone]

## 2020-03-04 ENCOUNTER — TELEPHONE (OUTPATIENT)
Dept: FAMILY MEDICINE CLINIC | Age: 55
End: 2020-03-04

## 2020-03-04 NOTE — TELEPHONE ENCOUNTER
Left message for patient to call me back regarding authorization from [employer] to cover code (). ... Called [employer] & this is an uncovered service. ... Will inform patient when he calls back. ...

## 2020-06-05 ENCOUNTER — TELEPHONE (OUTPATIENT)
Dept: FAMILY MEDICINE CLINIC | Age: 55
End: 2020-06-05

## 2020-06-05 NOTE — TELEPHONE ENCOUNTER
Patient was seen by Dr. Dora Higuera on 12/03/19 for a complete physical. The documentation indicates that other topics were discussed that were outside of the range of a complete physical. The billing office explained this to the patient when the co-pay owed was questioned. The patient \"Says other things were just his history and shouldn't be charged for Dr reviewing history. ........ Melia Falcon please review. ... Melia Falcon \"    Do you feel that the extra coding for the level three service is warranted? Please advise. I will document on the claim.

## 2020-11-03 ENCOUNTER — VIRTUAL VISIT (OUTPATIENT)
Dept: FAMILY MEDICINE CLINIC | Age: 55
End: 2020-11-03
Payer: OTHER GOVERNMENT

## 2020-11-03 DIAGNOSIS — Z00.00 ENCOUNTER FOR PHYSICAL EXAMINATION: Primary | ICD-10-CM

## 2020-11-03 DIAGNOSIS — Z87.438 HISTORY OF BPH: ICD-10-CM

## 2020-11-03 DIAGNOSIS — R25.1 TREMOR OF BOTH HANDS: ICD-10-CM

## 2020-11-03 PROCEDURE — 99396 PREV VISIT EST AGE 40-64: CPT | Performed by: STUDENT IN AN ORGANIZED HEALTH CARE EDUCATION/TRAINING PROGRAM

## 2020-11-03 PROCEDURE — 99213 OFFICE O/P EST LOW 20 MIN: CPT | Performed by: STUDENT IN AN ORGANIZED HEALTH CARE EDUCATION/TRAINING PROGRAM

## 2020-11-03 NOTE — PROGRESS NOTES
Carson Morales  48 y.o. male  1965  [address] [facility] [address]  [id number]   [address]:    Telemedicine Progress Note  Jose Luis Tracey MD       Encounter Date and Time: November 3, 2020 at 1:08 PM    Consent:  He and/or the health care decision maker is aware that that he may receive a bill for this telephone service, depending on his insurance coverage, and has provided verbal consent to proceed: Yes    Chief Complaint   Patient presents with    Complete Physical     History of Present Illness   Carson Morales is a 48 y.o. male was evaluated by synchronous (real-time) audio-video technology from home, through the [employer] Patient Portal.    Health Maintenance  - colonoscopy UTD  - has shingrix vaccines  - due for influenza + labs  - hx of BPH s/p TURPs so gets PSA check annually    Tremor  Has noticed \"shakiness\" in his hands for about 1 year. Happens when doing things like writing, typing etc. Worse when leaning elbows on chair etc. Notices it equally at home and work. Does not affect gross motor activities - avid mountain biker. Thinks it may be related to stress however mom with hx of Parkinson's so it worries him as well. He has not had any associated visual sx, no increased clumsiness, no HA. He can go several days without noticing the shakiness. No family history of essential tremor. Does have intermittent lightheadness. Takes several supplements but has only started on two newer ones (prevagen + ashwgandha) in the past 2 months after the development of these symptoms. 3 most recent PHQ Screens 11/3/2020   Little interest or pleasure in doing things Not at all   Feeling down, depressed, irritable, or hopeless Not at all   Total Score PHQ 2 0         Review of Systems   Review of Systems   Constitutional: Negative for chills, fever and malaise/fatigue. Eyes: Negative for blurred vision, double vision and pain.    Respiratory: Negative for cough and shortness of breath. Cardiovascular: Negative for chest pain and palpitations. Musculoskeletal: Negative for falls and myalgias. Neurological: Positive for tremors. Negative for dizziness, focal weakness and headaches. Psychiatric/Behavioral: Negative for depression. The patient is not nervous/anxious and does not have insomnia. Patient Screening for COVID-19:     1) Patient denies complaints of shortness of breath or difficulty breathing, sore throat, chills, fatigue, muscle aches, loss of taste or smell, or GI symptoms(nausea, vomiting or diarrhea): Yes    2) Patient denies complaints of cough or fever over 100F: Yes    3) Patient denies leaving the country in the past 14 days or any other recent travel: Yes    4) Patient denies being exposed to anyone with COVID-19 and has not been around any one that has been sick with COVID-19 like symptoms as listed above: Yes     5) Patient informed to wear mask when arriving for appointment: Yes      Vitals/Objective:     General: alert, cooperative, no distress   Mental  status: mental status: alert, oriented to person, place, and time, normal mood, behavior, speech, dress, motor activity, and thought processes   Resp: resp: normal effort and no respiratory distress   Neuro: neuro: no gross deficits, no appreciable tremor, mouth twitching, normal speech   Skin: skin: no discoloration or lesions of concern on visible areas   Due to this being a TeleHealth evaluation, many elements of the physical examination are unable to be assessed. Assessment and Plan:     51yo M with hx fo BPH s/p TURP, MARCIAL on CPAP, hiatal hernia and diverticulosis presenting for annual wellness exam and with concern of hand tremor. Health maintenance reviewed and updated - due for labs and flu shot (scheduled for 11/5/2020 at 9:45am with BP check). Patient description c/w intermittent intention tremor of undetermined significance.  Will obtain complete labs to r/o potential common underlying causes such as anemia, thyroid dysfunction or electrolyte disturbances. If bloodwork is unremarkable will consider referral for complete neurologic exam given family hs of parkinson's. 1. Encounter for physical examination  - METABOLIC PANEL, COMPREHENSIVE; Future  - LIPID PANEL; Future  - HEMOGLOBIN A1C WITH EAG; Future  - CBC W/O DIFF; Future  - BP check 11/5/2020  - flu shot 11/5/2020    2. History of BPH: currently asymptomatic  - PSA W/ REFLX FREE PSA; Future    3. Tremor of both hands  - VITAMIN B12; Future  - VITAMIN D, 25 HYDROXY; Future  - TSH 3RD GENERATION; Future  - consider neuro referral if bloodwork is unremarkable    Time spent in direct conversation with the patient to include medical condition(s) discussed, assessment and treatment plan:       We discussed the expected course, resolution and complications of the diagnosis(es) in detail. Medication risks, benefits, costs, interactions, and alternatives were discussed as indicated. I advised him to contact the office if his condition worsens, changes or fails to improve as anticipated. He expressed understanding with the diagnosis(es) and plan. Patient understands that this encounter was a temporary measure, and the importance of further follow up and examination was emphasized. Patient verbalized understanding. Patient informed to follow up: Follow-up and Dispositions  ·   Return in about 2 days (around 11/5/2020), or 9:45 am for flu shot, labs and BP check. Electronically Signed: Mandeep Delong MD    Providers location when delivering service: home    CPT Codes 74959-30016 for Established Patients may apply to this Telehealth Visit. POS code: 18.   Modifier GT      Pursuant to the emergency declaration under the [facility] waiver authority and the [employer] Act, this Virtual  Visit was conducted, with patient's consent, to reduce the patient's risk of exposure to COVID-19 and provide continuity of care for an established patient. Services were provided through a video synchronous discussion virtually to substitute for in-person clinic visit. History   Patients past medical, surgical and family histories were reviewed and updated. Past Medical History:   Diagnosis Date    BPH (benign prostatic hyperplasia)     Calculus of kidney     Diverticulosis     H/O seasonal allergies     Hiatal hernia     Obstructive sleep apnea     CPAP     Past Surgical History:   Procedure Laterality Date    COLONOSCOPY N/A 2017    COLONOSCOPY performed by Haven Monte MD at [address] [place] [address] HX APPENDECTOMY      [facility]     history of vasectomy    [place] TRABECULOPLASTY BY LASER SURGERY      LASIC    TRANSURETHRAL INCISION OF PROSTATE       Family History   Problem Relation Age of Onset    Other Mother         Parkinson's Disease    Heart Disease Father         heart valve defect    Heart Attack Father          at 40 of ? defect    Diabetes Maternal Grandfather     Stroke Maternal Grandfather     Kidney Disease Maternal Grandfather     Asthma Brother      Social History     Socioeconomic History    Marital status:      Spouse name: Not on file    Number of children: Not on file    Years of education: Not on file    Highest education level: Not on file   Occupational History    Not on file   Social Needs    Financial resource strain: Not on file    Food insecurity     Worry: Not on file     Inability: Not on file    Transportation needs     Medical: Not on file     Non-medical: Not on file   Tobacco Use    Smoking status: Never Smoker    Smokeless tobacco: Never Used   Substance and Sexual Activity    Alcohol use:  Yes     Alcohol/week: 1.7 standard drinks     Types: 2 Cans of beer per week    Drug use: No    Sexual activity: Yes     Partners: Female   Lifestyle    Physical activity     Days per week: Not on file     Minutes per session: Not on file    Stress: Not on file   Relationships    Social connections     Talks on phone: Not on file     Gets together: Not on file     Attends Gnosticist service: Not on file     Active member of club or organization: Not on file     Attends meetings of clubs or organizations: Not on file     Relationship status: Not on file    Intimate partner violence     Fear of current or ex partner: Not on file     Emotionally abused: Not on file     Physically abused: Not on file     Forced sexual activity: Not on file   Other Topics Concern    Not on file   Social History Narrative    Not on file     Patient Active Problem List   Diagnosis Code    Sleep apnea G47.30    History of BPH Z87.438    S/P colonoscopy Z98.890    Diverticulosis K57.90    Polyp of transverse colon K63.5    Sliding hiatal hernia K44.9    Dyslipidemia E78.5          Current Medications/Allergies   Medications and Allergies reviewed:    Current Outpatient Medications   Medication Sig Dispense Refill    omeprazole (PRILOSEC) 20 mg capsule Take 20 mg by mouth daily.  MULTIVITAMIN PO Take  by mouth.          No Known Allergies

## 2020-11-03 NOTE — PATIENT INSTRUCTIONS
Well Visit, Men 48 to 72: Care Instructions Your Care Instructions Physical exams can help you stay healthy. Your doctor has checked your overall health and may have suggested ways to take good care of yourself. He or she also may have recommended tests. At home, you can help prevent illness with healthy eating, regular exercise, and other steps. Follow-up care is a key part of your treatment and safety. Be sure to make and go to all appointments, and call your doctor if you are having problems. It's also a good idea to know your test results and keep a list of the medicines you take. How can you care for yourself at home? · Reach and stay at a healthy weight. This will lower your risk for many problems, such as obesity, diabetes, heart disease, and high blood pressure. · Get at least 30 minutes of exercise on most days of the week. Walking is a good choice. You also may want to do other activities, such as running, swimming, cycling, or playing tennis or team sports. · Do not smoke. Smoking can make health problems worse. If you need help quitting, talk to your doctor about stop-smoking programs and medicines. These can increase your chances of quitting for good. · Protect your skin from too much sun. When you're outdoors from 10 a.m. to 4 p.m., stay in the shade or cover up with clothing and a hat with a wide brim. Wear sunglasses that block UV rays. Even when it's cloudy, put broad-spectrum sunscreen (SPF 30 or higher) on any exposed skin. · See a dentist one or two times a year for checkups and to have your teeth cleaned. · Wear a seat belt in the car. Follow your doctor's advice about when to have certain tests. These tests can spot problems early. · Cholesterol. Your doctor will tell you how often to have this done based on your overall health and other things that can increase your risk for heart attack and stroke. · Blood pressure.  Have your blood pressure checked during a routine doctor visit. Your doctor will tell you how often to check your blood pressure based on your age, your blood pressure results, and other factors. · Prostate exam. Talk to your doctor about whether you should have a blood test (called a PSA test) for prostate cancer. Experts recommend that you discuss the benefits and risks of the test with your doctor before you decide whether to have this test. 
· Diabetes. Ask your doctor whether you should have tests for diabetes. · Vision. Some experts recommend that you have yearly exams for glaucoma and other age-related eye problems starting at age 48. · Hearing. Tell your doctor if you notice any change in your hearing. You can have tests to find out how well you hear. · Colorectal cancer. Your risk for colorectal cancer gets higher as you get older. Some experts say that adults should start regular screening at age 48 and stop at age 76. Others say to start before age 48 or continue after age 76. Talk with your doctor about your risk and when to start and stop screening. · Heart attack and stroke risk. At least every 4 to 6 years, you should have your risk for heart attack and stroke assessed. Your doctor uses factors such as your age, blood pressure, cholesterol, and whether you smoke or have diabetes to show what your risk for a heart attack or stroke is over the next 10 years. · Abdominal aortic aneurysm. Ask your doctor whether you should have a test to check for an aneurysm. You may need a test if you ever smoked or if your parent, brother, sister, or child has had an aneurysm. When should you call for help? Watch closely for changes in your health, and be sure to contact your doctor if you have any problems or symptoms that concern you. Where can you learn more? Go to http://www.gray.com/ Enter K656 in the search box to learn more about \"Well Visit, Men 48 to 72: Care Instructions. \" 
 Current as of: May 27, 2020               Content Version: 12.6 © 0076-8539 [employer]. Care instructions adapted under license by [employer] (which disclaims liability or warranty for this information). If you have questions about a medical condition or this instruction, always ask your healthcare professional. [address] any warranty or liability for your use of this information.

## 2020-11-05 ENCOUNTER — CLINICAL SUPPORT (OUTPATIENT)
Dept: FAMILY MEDICINE CLINIC | Age: 55
End: 2020-11-05
Payer: OTHER GOVERNMENT

## 2020-11-05 ENCOUNTER — LAB ONLY (OUTPATIENT)
Dept: FAMILY MEDICINE CLINIC | Age: 55
End: 2020-11-05

## 2020-11-05 VITALS
RESPIRATION RATE: 16 BRPM | DIASTOLIC BLOOD PRESSURE: 71 MMHG | OXYGEN SATURATION: 98 % | HEART RATE: 80 BPM | SYSTOLIC BLOOD PRESSURE: 128 MMHG

## 2020-11-05 DIAGNOSIS — R25.1 TREMOR OF BOTH HANDS: ICD-10-CM

## 2020-11-05 DIAGNOSIS — Z23 ENCOUNTER FOR IMMUNIZATION: Primary | ICD-10-CM

## 2020-11-05 DIAGNOSIS — Z87.438 HISTORY OF BPH: ICD-10-CM

## 2020-11-05 DIAGNOSIS — Z00.00 ENCOUNTER FOR PHYSICAL EXAMINATION: ICD-10-CM

## 2020-11-05 LAB
25(OH)D3 SERPL-MCNC: 39.6 NG/ML (ref 30–100)
ALBUMIN SERPL-MCNC: 4.2 G/DL (ref 3.5–5)
ALBUMIN/GLOB SERPL: 1.5 {RATIO} (ref 1.1–2.2)
ALP SERPL-CCNC: 87 U/L (ref 45–117)
ALT SERPL-CCNC: 48 U/L (ref 12–78)
ANION GAP SERPL CALC-SCNC: 7 MMOL/L (ref 5–15)
AST SERPL-CCNC: 18 U/L (ref 15–37)
BILIRUB SERPL-MCNC: 0.6 MG/DL (ref 0.2–1)
BUN SERPL-MCNC: 14 MG/DL (ref 6–20)
BUN/CREAT SERPL: 14 (ref 12–20)
CALCIUM SERPL-MCNC: 9.7 MG/DL (ref 8.5–10.1)
CHLORIDE SERPL-SCNC: 107 MMOL/L (ref 97–108)
CHOLEST SERPL-MCNC: 195 MG/DL
CO2 SERPL-SCNC: 27 MMOL/L (ref 21–32)
CREAT SERPL-MCNC: 1.01 MG/DL (ref 0.7–1.3)
ERYTHROCYTE [DISTWIDTH] IN BLOOD BY AUTOMATED COUNT: 13.1 % (ref 11.5–14.5)
EST. AVERAGE GLUCOSE BLD GHB EST-MCNC: 105 MG/DL
GLOBULIN SER CALC-MCNC: 2.8 G/DL (ref 2–4)
GLUCOSE SERPL-MCNC: 106 MG/DL (ref 65–100)
HBA1C MFR BLD: 5.3 % (ref 4–5.6)
HCT VFR BLD AUTO: 47 % (ref 36.6–50.3)
HDLC SERPL-MCNC: 40 MG/DL
HDLC SERPL: 4.9 {RATIO} (ref 0–5)
HGB BLD-MCNC: 15.6 G/DL (ref 12.1–17)
LDLC SERPL CALC-MCNC: 111.8 MG/DL (ref 0–100)
LIPID PROFILE,FLP: ABNORMAL
MCH RBC QN AUTO: 28.9 PG (ref 26–34)
MCHC RBC AUTO-ENTMCNC: 33.2 G/DL (ref 30–36.5)
MCV RBC AUTO: 87.2 FL (ref 80–99)
NRBC # BLD: 0 K/UL (ref 0–0.01)
NRBC BLD-RTO: 0 PER 100 WBC
PLATELET # BLD AUTO: 218 K/UL (ref 150–400)
PMV BLD AUTO: 9.8 FL (ref 8.9–12.9)
POTASSIUM SERPL-SCNC: 3.9 MMOL/L (ref 3.5–5.1)
PROT SERPL-MCNC: 7 G/DL (ref 6.4–8.2)
RBC # BLD AUTO: 5.39 M/UL (ref 4.1–5.7)
SODIUM SERPL-SCNC: 141 MMOL/L (ref 136–145)
TRIGL SERPL-MCNC: 216 MG/DL (ref ?–150)
TSH SERPL DL<=0.05 MIU/L-ACNC: 1.44 UIU/ML (ref 0.36–3.74)
VIT B12 SERPL-MCNC: 623 PG/ML (ref 193–986)
VLDLC SERPL CALC-MCNC: 43.2 MG/DL
WBC # BLD AUTO: 5.8 K/UL (ref 4.1–11.1)

## 2020-11-05 PROCEDURE — 90471 IMMUNIZATION ADMIN: CPT

## 2020-11-05 PROCEDURE — 90686 IIV4 VACC NO PRSV 0.5 ML IM: CPT

## 2020-11-05 NOTE — PROGRESS NOTES
Chief Complaint   Patient presents with    Blood Pressure Check    Immunization/Injection     Patient is here for blood pressure check and flu shot.   Visit Vitals  /71 (BP 1 Location: Left arm, BP Patient Position: Sitting)     Immunization History   Administered Date(s) Administered    Influenza Vaccine 11/06/2015    Influenza Vaccine (Quad) PF (>6 Mo Flulaval, Fluarix, and >3 Yrs [address] [facility] [place] [address]) 11/18/2016, 11/13/2017, 11/16/2018, 12/03/2019, 11/05/2020    Tdap 11/06/2015

## 2020-11-07 LAB
PSA SERPL-MCNC: 1 NG/ML (ref 0–4)
REFLEX CRITERIA: NORMAL

## 2020-11-10 NOTE — PROGRESS NOTES
[address] Medicine Residency Attending Addendum:  Dr. Chayo Avila MD,  the patient and I were not physically present during this encounter. The resident and I are concurrently monitoring the patient care through appropriate telecommunication technology. I discussed the findings, assessment and plan with the resident and agree with the resident's findings and plan as documented in the resident's note.       Leanne Oliveira MD

## 2020-11-11 NOTE — PROGRESS NOTES
PSA, TSH, Vit D, B12, A1c, CBC, CMP WNL  ASCVD Risk 6.1% - no statin indicated  [employer] Sent

## 2021-01-08 DIAGNOSIS — R25.1 TREMOR OF BOTH HANDS: Primary | ICD-10-CM

## 2021-01-13 ENCOUNTER — TELEPHONE (OUTPATIENT)
Dept: FAMILY MEDICINE CLINIC | Age: 56
End: 2021-01-13

## 2021-01-13 NOTE — TELEPHONE ENCOUNTER
Updated patient referral order and will work on his PA    Close enc  Thanks  Elizabeth Chicas S/PSR  [facility] Referral Coordinator

## 2021-01-29 ENCOUNTER — OFFICE VISIT (OUTPATIENT)
Dept: NEUROLOGY | Age: 56
End: 2021-01-29
Payer: OTHER GOVERNMENT

## 2021-01-29 VITALS
DIASTOLIC BLOOD PRESSURE: 78 MMHG | HEIGHT: 70 IN | RESPIRATION RATE: 18 BRPM | TEMPERATURE: 97 F | WEIGHT: 210 LBS | SYSTOLIC BLOOD PRESSURE: 138 MMHG | BODY MASS INDEX: 30.06 KG/M2

## 2021-01-29 DIAGNOSIS — G25.0 ESSENTIAL TREMOR: Primary | ICD-10-CM

## 2021-01-29 PROCEDURE — 99204 OFFICE O/P NEW MOD 45 MIN: CPT | Performed by: PSYCHIATRY & NEUROLOGY

## 2021-01-29 RX ORDER — LORATADINE 10 MG/1
10 TABLET ORAL
COMMUNITY
End: 2021-11-19

## 2021-01-29 NOTE — PROGRESS NOTES
[facility] and [address] at [facility] Neurology Clinics at [facility] [address], [address] [facility] [address]  [phone] Office  [phone] Facsimile           Referring: Dr. Johanna Witt    No chief complaint on file. 72-year-old right-handed gentleman presents today for initial neurologic consultation regarding tremor of his hands and arms. He says that about a year ago he started noticing some tremor and its gotten a bit worse. He notes that if he is holding his phone he can see his hands shaking. He says at times when he gets out of the shower. If he is holding something much more noticeable. Increases with stress. He does not really drink regularly so he is not sure as to whether alcohol can help. He had an uncle with head tremor. His mother had Parkinson's disease with symptoms starting in her 76s or so but the last few years of her life for and steady decline and she passed at the age of 80. No resting tremor. No injury. No recent illness. He had a recent primary visit with laboratory analyses that were normal.    Record review finds office visit with Dr. Johanna Witt November 3, 2020 where he was seen virtually for what is described as shakiness of his hands for about a year. It was described more as an action type tremor. He question stress but has family history of Parkinson's with his mother and he was rightly concerned. He had laboratory analyses sent and plan was to send to neuro if the blood work was unremarkable.     Laboratory review  B12 normal at 623  Vitamin D normal at 84.5  Metabolic panel unremarkable  Lipid panel total cholesterol 195 with   Hemoglobin A1c 5.3  CBC unremarkable  PSA normal at 1    Past Medical History:   Diagnosis Date    BPH (benign prostatic hyperplasia)     Calculus of kidney     Diverticulosis     H/O seasonal allergies     Hiatal hernia     Obstructive sleep apnea     CPAP       Past Surgical History:   Procedure Laterality Date    COLONOSCOPY N/A 2017    COLONOSCOPY performed by Mark Wright MD at [address] [facility] HX APPENDECTOMY      [facility]     history of vasectomy    MI TRABECULOPLASTY BY LASER SURGERY      LASIC    TRANSURETHRAL INCISION OF PROSTATE         Current Outpatient Medications   Medication Sig Dispense Refill    omeprazole (PRILOSEC) 20 mg capsule Take 20 mg by mouth daily.  MULTIVITAMIN PO Take  by mouth. No Known Allergies    Social History     Tobacco Use    Smoking status: Never Smoker    Smokeless tobacco: Never Used   Substance Use Topics    Alcohol use: Yes     Alcohol/week: 1.7 standard drinks     Types: 2 Cans of beer per week    Drug use: No       Family History   Problem Relation Age of Onset    Other Mother         Parkinson's Disease    Heart Disease Father         heart valve defect    Heart Attack Father          at 40 of ? defect    Diabetes Maternal Grandfather     Stroke Maternal Grandfather     Kidney Disease Maternal Grandfather     Asthma Brother        Review of Systems  Pertinent positives and negatives as noted with remainder of comprehensive review negative      Examination  Visit Vitals  Temp 97 °F (36.1 °C)     Visit Vitals  /78   Temp 97 °F (36.1 °C)   Resp 18   Ht 5' 10\" (1.778 m)   Wt 95.3 kg (210 lb)   BMI 30.13 kg/m²       Pleasant gentleman. Well-appearing. No icterus. No edema. He is awake alert oriented and conversant. He has normal speech and language. Normal cognition. Cranial nerves intact 2-12. Disc margins flat. He has no pronation or drift. He has postural tremor of the outstretched hands. No rest tremor. No cogwheeling. Intention on finger-nose-finger. Writing sample adequate without evidence of tremor. He has amplified tremor when holding a piece of paper straight outward. No ataxia.   Reflexes symmetrical.  No pathologic reflexes. Impression/Plan  Essential tremor. Discussed the difference between Parkinson's disease and essential tremor. Discussed the benign but bothersome course. Discussed potential treatment modalities  Including Mysoline and Inderal as well as their potential side effects. Discussed also this is a disorder that does not need to be treated unless bothersome enough to take a daily medication and the medications do nothing but treat the symptoms, not the disorder--ie no cure. At this point he is reassured that there is nothing ominous ongoing and does not feel like the tremor is to the point that he like to treatment. Certainly if more worsens to the point he like to try treatment he will give us a call and would be quite happy to see this gentleman as needed    Aurora Harris MD          This note was created using voice recognition software. Despite editing, there may be syntax errors.

## 2021-06-23 ENCOUNTER — TELEPHONE (OUTPATIENT)
Dept: FAMILY MEDICINE CLINIC | Age: 56
End: 2021-06-23

## 2021-06-23 NOTE — TELEPHONE ENCOUNTER
Just need new Pulm ref order placed into [facility] for tracking purposes    Let me know when order is placed into [facility] so that I can work it    Thanks  Radha/Referral Specialist  [facility] Referral Coordinator

## 2021-06-23 NOTE — TELEPHONE ENCOUNTER
----- Message from [facility] sent at 6/23/2021 11:51 AM EDT -----  Regarding: Dr. Yang Murray  Referral    Caller (first and last name if not the patient or from practice):  N/A      Caller's relationship to patient (if not from a practice):   N/A      Name of caller (if calling from a practice):  Arline Navarro      Name of practice:  [facility] title, first, and last name:  Dr. Magalie Nelson      Office Phone Number:  [phone]      Fax number:  [phone]      Date and time of appointment:  6/30/21 at 8:30a.m. Reason for appointment:  Diagnosis code G47.33      Details to clarify the request:     Arline Navarro is requesting an insurance referral for [employer].       [facility]

## 2021-06-28 DIAGNOSIS — G47.30 SLEEP APNEA, UNSPECIFIED TYPE: Primary | ICD-10-CM

## 2021-06-30 NOTE — TELEPHONE ENCOUNTER
Updated patient's referral order and will work on Marlena Frosther/Referral Specialist  [facility] Referral Coordinator

## 2021-06-30 NOTE — TELEPHONE ENCOUNTER
Dr. Love Loud telephone  Received: Theadora Brittle, Rodericludy Hammer [facility] [address] [facility] Office  Referral     Caller (first and last name if not the patient or from practice): N/A       Caller's relationship to patient (if not from a practice):N/A       Name of caller (if calling from a practice): N/A       Name of practice: [facility]       Specialist's title, first, and last name: Dr. Jem Nunn       Office Phone Number: [phone]       Fax number: [phone]       Date and time of appointment: 7/12/21 8:30am       Reason for appointment: Annual check for sleep apnea       Details to clarify the request: N/A       Penelope Giles

## 2021-10-25 NOTE — TELEPHONE ENCOUNTER
----- Message from Ra Vicente sent at 11/24/2017  8:55 AM EST -----  Regarding: Dr. Madhavi Cooper  Pt requesting a call back from [facility] in regards to a referral to f ro being seen in [facility].Best contact [phone]. What is lung cancer screening? Lung cancer screening is a way in which doctors check the lungs for early signs of cancer in people who have no symptoms of lung cancer. A low-dose CT scan uses much less radiation than a normal CT scan and shows a more detailed image of the lungs than a standard X-ray. The goal of lung cancer screening is to find cancer early, before it has a chance to grow, spread, or cause problems. One large study found that smokers who were screened with low-dose CT scans were less likely to die of lung cancer than those who were screened with standard X-ray. Below is a summary of the things you need to know regarding screening for lung cancer with low-dose computed tomography (LDCT). This is a screening program that involves routine annual screening with LDCT studies of the lung. The LDCTs are done using low-dose radiation that is not thought to increase your cancer risk. If you have other serious medical conditions (other cancers, congestive heart failure) that limit your life expectancy to less than 10 years, you should not undergo lung cancer screening with LDCT. The chance is 20%-60% that the LDCT result will show abnormalities. This would require additional testing which could include repeat imaging or even invasive procedures. Most (about 95%) of \"abnormal\" LDCT results are false in the sense that no lung cancer is ultimately found. Additionally, some (about 10%) of the cancers found would not affect your life expectancy, even if undetected and untreated. If you are still smoking, the single most important thing that you can do to reduce your risk of dying of lung cancer is to quit. For this screening to be covered by Medicare and most other insurers, strict criteria must be met. If you do not meet these criteria, but still wish to undergo LDCT testing, you will be required to sign a waiver indicating your willingness to pay for the scan.

## 2021-11-03 ENCOUNTER — TELEPHONE (OUTPATIENT)
Dept: FAMILY MEDICINE CLINIC | Age: 56
End: 2021-11-03

## 2021-11-03 NOTE — TELEPHONE ENCOUNTER
----- Message from Lisy Rachel sent at 11/2/2021  1:03 PM EDT -----  Subject: Referral Request    QUESTIONS   Reason for referral request? Blood work before physical 11/19   Has the physician seen you for this condition before? No   Preferred Specialist (if applicable)? Do you already have an appointment scheduled? No  Additional Information for Provider? Patient would like to know if he   needs to know to fast for labs.  ---------------------------------------------------------------------------  --------------  CALL BACK INFO  What is the best way for the office to contact you? OK to leave message on   voicemail  Preferred Call Back Phone Number?  [phone]

## 2021-11-04 ENCOUNTER — TELEPHONE (OUTPATIENT)
Dept: FAMILY MEDICINE CLINIC | Age: 56
End: 2021-11-04

## 2021-11-04 DIAGNOSIS — Z78.9 HEALTH MAINTENANCE ALTERATION: Primary | ICD-10-CM

## 2021-11-05 NOTE — TELEPHONE ENCOUNTER
Called and informed patient that Dr. Con Peterson ordered his labs. I scheduled him a lab appt for next week. Patient aware to be fasting.

## 2021-11-10 ENCOUNTER — LAB ONLY (OUTPATIENT)
Dept: FAMILY MEDICINE CLINIC | Age: 56
End: 2021-11-10

## 2021-11-10 DIAGNOSIS — Z78.9 HEALTH MAINTENANCE ALTERATION: ICD-10-CM

## 2021-11-10 LAB
ANION GAP SERPL CALC-SCNC: 3 MMOL/L (ref 5–15)
BUN SERPL-MCNC: 15 MG/DL (ref 6–20)
BUN/CREAT SERPL: 15 (ref 12–20)
CALCIUM SERPL-MCNC: 10 MG/DL (ref 8.5–10.1)
CHLORIDE SERPL-SCNC: 106 MMOL/L (ref 97–108)
CHOLEST SERPL-MCNC: 186 MG/DL
CO2 SERPL-SCNC: 30 MMOL/L (ref 21–32)
CREAT SERPL-MCNC: 1.02 MG/DL (ref 0.7–1.3)
EST. AVERAGE GLUCOSE BLD GHB EST-MCNC: 108 MG/DL
GLUCOSE SERPL-MCNC: 103 MG/DL (ref 65–100)
HBA1C MFR BLD: 5.4 % (ref 4–5.6)
HDLC SERPL-MCNC: 43 MG/DL
HDLC SERPL: 4.3 {RATIO} (ref 0–5)
LDLC SERPL CALC-MCNC: 99.8 MG/DL (ref 0–100)
POTASSIUM SERPL-SCNC: 4.1 MMOL/L (ref 3.5–5.1)
SODIUM SERPL-SCNC: 139 MMOL/L (ref 136–145)
TRIGL SERPL-MCNC: 216 MG/DL (ref ?–150)
VLDLC SERPL CALC-MCNC: 43.2 MG/DL

## 2021-11-11 LAB
PSA SERPL-MCNC: 0.5 NG/ML (ref 0–4)
REFLEX CRITERIA: NORMAL

## 2021-11-11 NOTE — PROGRESS NOTES
A1C normal  BMP unremarkable  Lipids largely same as a year ago with LDL improved to below 100.    PSA pending

## 2021-11-19 ENCOUNTER — OFFICE VISIT (OUTPATIENT)
Dept: FAMILY MEDICINE CLINIC | Age: 56
End: 2021-11-19
Payer: OTHER GOVERNMENT

## 2021-11-19 VITALS
DIASTOLIC BLOOD PRESSURE: 92 MMHG | WEIGHT: 204 LBS | HEIGHT: 70 IN | TEMPERATURE: 97.8 F | HEART RATE: 80 BPM | OXYGEN SATURATION: 98 % | RESPIRATION RATE: 17 BRPM | SYSTOLIC BLOOD PRESSURE: 136 MMHG | BODY MASS INDEX: 29.2 KG/M2

## 2021-11-19 DIAGNOSIS — Z00.00 ANNUAL PHYSICAL EXAM: Primary | ICD-10-CM

## 2021-11-19 DIAGNOSIS — Z87.19 HISTORY OF DIVERTICULOSIS: ICD-10-CM

## 2021-11-19 PROCEDURE — 90686 IIV4 VACC NO PRSV 0.5 ML IM: CPT | Performed by: FAMILY MEDICINE

## 2021-11-19 PROCEDURE — 99396 PREV VISIT EST AGE 40-64: CPT | Performed by: FAMILY MEDICINE

## 2021-11-19 RX ORDER — CHOLECALCIFEROL (VITAMIN D3) 50 MCG
CAPSULE ORAL
COMMUNITY

## 2021-11-19 RX ORDER — CETIRIZINE HYDROCHLORIDE 10 MG/1
CAPSULE, LIQUID FILLED ORAL
COMMUNITY

## 2021-11-19 RX ORDER — MULTIVIT WITH MINERALS/HERBS
1 TABLET ORAL DAILY
COMMUNITY

## 2021-11-19 NOTE — PROGRESS NOTES
Jose   55 y.o. male  1965  NANCY:[id number]  [address] [facility]  Progress Note     Encounter Date: 2021    Assessment and Plan:    1. Annual physical exam  Doing well overall. No significant medical concerns. Discussed supplements and he will bring bottles next time to make sure he does not take too much of same substances. Counseling as below. 2. History of diverticulosis  Per patient he is due for scope and would like to see Dr Eva Sim again. Referral placed. Fiber diet discussed. - REFERRAL TO GASTROENTEROLOGY      · Counseled re: diet, exercise, healthy lifestyle    · Appropriate labs, vaccines, imaging studies, and referrals ordered as listed above    · Discussed the patient's BMI with him. The BMI follow up plan is as follows: I have counseled this patient on diet and exercise regimens. Reviewed strategies to maximize success, including written materials. Diagnoses and plans were discussed with the patient. After visit summary given to the patient as well. Flynn Lau MD      Jose [place] is a 55 y.o. male who had concerns including Well Male and Referral Request (GI). Health Maintenance  Immunizations:    Influenza: will do today   Tetanus: up to date   Shingles: done before will bring records    Pneumovacc: up to date    COVID vaccine status:  utd    Cancer screening:     Colon: guidelines reviewed, up to date    Lung:  guideline reviewed, not indicated    Smoking:  Non smoker      ETOH:  Non drinker    Drugs:  Never user    Last dental exam:  sees regularly      The following sections were reviewed & updated as appropriate: PMH, PSH, FH, and SH.     Patient Active Problem List   Diagnosis Code    Sleep apnea G47.30    History of BPH Z87.438    S/P colonoscopy Z98.890    Diverticulosis K57.90    Polyp of transverse colon K63.5    Sliding hiatal hernia K44.9    Dyslipidemia E78.5          Prior to Admission medications    Medication Sig Start Date End Date Taking? Authorizing Provider   b complex vitamins tablet Take 1 Tablet by mouth daily. Yes Provider, Historical   coffee xt/phosphatidyl serine (NEURIVA ORIGINAL PO) Take  by mouth. Yes Provider, Historical   omega 3-dha-epa-fish oil (Fish OiL) 100-160-1,000 mg cap Take  by mouth. Yes Provider, Historical   Cetirizine (ZyrTEC) 10 mg cap Take  by mouth. Yes Provider, Historical   ascorbic acid (ZAFAR-C PO) Take  by mouth. Yes Provider, Historical   ergocalciferol, vitamin D2, (VITAMIN D2 PO) Take  by mouth. Yes Provider, Historical   loratadine (Claritin) 10 mg tablet Take 10 mg by mouth.  11/19/21 Yes Provider, Historical   cyanocobalamin, vitamin B-12, (VITAMIN B-12 PO) Take  by mouth.  11/19/21 Yes Provider, Historical   omeprazole (PRILOSEC) 20 mg capsule Take 20 mg by mouth daily. 12/26/17  Yes Provider, Historical   MULTIVITAMIN PO Take  by mouth. Yes Provider, Historical          No Known Allergies         Review of Systems   Constitutional: Negative for fever and malaise/fatigue. HENT: Negative for congestion. Respiratory: Negative for cough and shortness of breath. Cardiovascular: Negative for palpitations and leg swelling. Gastrointestinal: Negative for abdominal pain, constipation, diarrhea, nausea and vomiting. Genitourinary: Negative for dysuria. Musculoskeletal: Negative for back pain and myalgias. Neurological: Negative for headaches. Psychiatric/Behavioral: Negative for depression, hallucinations and substance abuse. The patient is not nervous/anxious and does not have insomnia.         Smoker:   Social History     Tobacco Use   Smoking Status Never Smoker   Smokeless Tobacco Never Used     ETOH:   Social History     Substance and Sexual Activity   Alcohol Use Yes    Alcohol/week: 1.7 standard drinks    Types: 2 Cans of beer per week       FH:    Family History   Problem Relation Age of Onset    Other Mother         Parkinson's Disease    Heart Disease Father         heart valve defect    Heart Attack Father          at 40 of ? defect    Diabetes Maternal Grandfather     Stroke Maternal Grandfather     Kidney Disease Maternal Grandfather     Asthma Brother        Physical Exam:    Visit Vitals  BP (!) 136/92   Pulse 80   Temp 97.8 °F (36.6 °C) (Temporal)   Resp 17   Ht 5' 10\" (1.778 m)   Wt 204 lb (92.5 kg)   SpO2 98%   BMI 29.27 kg/m²     Physical Exam  Physical Examination: General appearance - alert, well appearing, and in no distress, oriented to person, place, and time and normal appearing weight  Mental status -  normal mood, behavior, speech, dress, motor activity, and thought processes, no expressed suicidal or homicidal ideation, no hallucinations  Ears - bilateral TM's and external ear canals normal  Nose - normal and patent, no erythema, discharge or polyps  Mouth - mucous membranes moist, pharynx normal without lesions  Neck - supple, no significant adenopathy  Chest - normal chest excursion, normal inspiratory and expiratory function. Clear to ausculation bilaterally. Heart - normal rate, regular rhythm, normal S1, S2, no murmurs, rubs, clicks or gallops, no extremity edema  Abdomen- benign, no organomegaly or masses  Skin-no rashes or suspicious moles  Neuro normal speech, moves all extremities, normal gait  Musculoskeletal- grossly normal joint and motor function.

## 2021-11-19 NOTE — MR AVS SNAPSHOT
Visit Information Date & Time Provider Department Dept. Phone Encounter #  
 11/13/2017  1:00 PM Bronwyn Fonseca MD [address] [place] [address] [phone] [id number] Follow-up Instructions Return in about 1 year (around 11/13/2018) for Annual exam. Upcoming Health Maintenance Date Due FOBT Q 1 YEAR AGE 50-75 10/29/2016 Influenza Age 5 to Adult 8/1/2017 DTaP/Tdap/Td series (2 - Td) 11/6/2025 Allergies as of 11/13/2017  Review Complete On: 11/13/2017 By: Pastora Verde LPN No Known Allergies Current Immunizations  Never Reviewed Name Date Influenza Vaccine 11/6/2015 Influenza Vaccine (Quad) PF 11/18/2016 Tdap 11/6/2015 Not reviewed this visit You Were Diagnosed With   
  
 Codes Comments Annual physical exam    -  Primary ICD-10-CM: Z00.00 ICD-9-CM: V70.0 S/P colonoscopy     ICD-10-CM: V76.343 ICD-9-CM: V45.89 Depression screening     ICD-10-CM: Z13.89 ICD-9-CM: V79.0 Vitals BP Pulse Temp Resp Height(growth percentile) Weight(growth percentile) (!) 148/91 68 98.4 °F (36.9 °C) (Oral) 16 5' 10\" (1.778 m) 188 lb 12.8 oz (85.6 kg) SpO2 BMI Smoking Status 96% 27.09 kg/m2 Never Smoker Vitals History BMI and BSA Data Body Mass Index Body Surface Area  
 27.09 kg/m 2 2.06 m 2 Preferred Pharmacy Pharmacy Name Phone [id number] [phone] [facility] [place] [address] [phone] Your Updated Medication List  
  
   
This list is accurate as of: 11/13/17  1:33 PM.  Always use your most recent med list.  
  
  
  
  
 Murel Hue Take  by mouth. MULTIVITAMIN PO Take  by mouth. Omeprazole delayed release 20 mg tablet Commonly known as:  PRILOSEC D/R Take 20 mg by mouth daily. We Performed the Following CBC W/O DIFF [83876 CPT(R)] LIPID PANEL [17091 CPT(R)] METABOLIC PANEL, COMPREHENSIVE [65810 CPT(R)] [address] [facility] [ [facility]] PSA W/ REFLX FREE PSA [48544 CPT(R)] Follow-up Instructions Return in about 1 year (around 11/13/2018) for Annual exam.  
  
  
Patient Instructions Well Visit, Men 48 to 72: Care Instructions Your Care Instructions Physical exams can help you stay healthy. Your doctor has checked your overall health and may have suggested ways to take good care of yourself. He or she also may have recommended tests. At home, you can help prevent illness with healthy eating, regular exercise, and other steps. Follow-up care is a key part of your treatment and safety. Be sure to make and go to all appointments, and call your doctor if you are having problems. It's also a good idea to know your test results and keep a list of the medicines you take. How can you care for yourself at home? · Reach and stay at a healthy weight. This will lower your risk for many problems, such as obesity, diabetes, heart disease, and high blood pressure. · Get at least 30 minutes of exercise on most days of the week. Walking is a good choice. You also may want to do other activities, such as running, swimming, cycling, or playing tennis or team sports. · Do not smoke. Smoking can make health problems worse. If you need help quitting, talk to your doctor about stop-smoking programs and medicines. These can increase your chances of quitting for good. · Protect your skin from too much sun. When you're outdoors from 10 a.m. to 4 p.m., stay in the shade or cover up with clothing and a hat with a wide brim. Wear sunglasses that block UV rays. Even when it's cloudy, put broad-spectrum sunscreen (SPF 30 or higher) on any exposed skin. · See a dentist one or two times a year for checkups and to have your teeth cleaned. · Wear a seat belt in the car. · Limit alcohol to 2 drinks a day. Too much alcohol can cause health problems. Follow your doctor's advice about when to have certain tests. These tests can spot problems early. · Cholesterol. Your doctor will tell you how often to have this done based on your overall health and other things that can increase your risk for heart attack and stroke. · Blood pressure. Have your blood pressure checked during a routine doctor visit. Your doctor will tell you how often to check your blood pressure based on your age, your blood pressure results, and other factors. · Prostate exam. Talk to your doctor about whether you should have a blood test (called a PSA test) for prostate cancer. Experts disagree on whether men should have this test. Some experts recommend that you discuss the benefits and risks of the test with your doctor. · Diabetes. Ask your doctor whether you should have tests for diabetes. · Vision. Some experts recommend that you have yearly exams for glaucoma and other age-related eye problems starting at age 48. · Hearing. Tell your doctor if you notice any change in your hearing. You can have tests to find out how well you hear. · Colon cancer. You should begin tests for colon cancer at age 48. You may have one of several tests. Your doctor will tell you how often to have tests based on your age and risk. Risks include whether you already had a precancerous polyp removed from your colon or whether your parent, brother, sister, or child has had colon cancer. · Heart attack and stroke risk. At least every 4 to 6 years, you should have your risk for heart attack and stroke assessed. Your doctor uses factors such as your age, blood pressure, cholesterol, and whether you smoke or have diabetes to show what your risk for a heart attack or stroke is over the next 10 years. · Abdominal aortic aneurysm. Ask your doctor whether you should have a test to check for an aneurysm. You may need a test if you ever smoked or if your parent, brother, sister, or child has had an aneurysm. When should you call for help? Watch closely for changes in your health, and be sure to contact your doctor if you have any problems or symptoms that concern you. Where can you learn more? Go to http://aris-gilma.info/. Enter E462 in the search box to learn more about \"Well Visit, Men 48 to 72: Care Instructions. \" Current as of: May 12, 2017 Content Version: 11.4 © 8108-5917 [employer]. Care instructions adapted under license by [employer] (which disclaims liability or warranty for this information). If you have questions about a medical condition or this instruction, always ask your healthcare professional. [address] any warranty or liability for your use of this information. Please provide this summary of care documentation to your next provider. Your primary care clinician is listed as Isidro Beauchamp. If you have any questions after today's visit, please call [phone]. no

## 2021-11-19 NOTE — PROGRESS NOTES
Chief Complaint   Patient presents with    Well Male    Referral Request     GI     1. Have you been to the ER, urgent care clinic since your last visit? Hospitalized since your last visit? No    2. Have you seen or consulted any other health care providers outside of the [address] [place] [address] since your last visit? Include any pap smears or colon screening.  No

## 2021-11-22 NOTE — PROGRESS NOTES
I reviewed with the resident the medical history and the resident's findings on the physical examination. I discussed with the resident the patient's diagnosis and concur with the plan.  Rpt /78; improved from initial.

## 2022-01-18 ENCOUNTER — PATIENT MESSAGE (OUTPATIENT)
Dept: FAMILY MEDICINE CLINIC | Age: 57
End: 2022-01-18

## 2022-01-26 ENCOUNTER — TELEPHONE (OUTPATIENT)
Dept: FAMILY MEDICINE CLINIC | Age: 57
End: 2022-01-26

## 2022-02-06 ENCOUNTER — APPOINTMENT (OUTPATIENT)
Dept: CT IMAGING | Age: 57
End: 2022-02-06
Attending: NURSE PRACTITIONER
Payer: OTHER GOVERNMENT

## 2022-02-06 ENCOUNTER — HOSPITAL ENCOUNTER (EMERGENCY)
Age: 57
Discharge: HOME | End: 2022-02-06
Attending: EMERGENCY MEDICINE
Payer: OTHER GOVERNMENT

## 2022-02-06 VITALS
HEIGHT: 70 IN | WEIGHT: 195 LBS | DIASTOLIC BLOOD PRESSURE: 88 MMHG | RESPIRATION RATE: 16 BRPM | OXYGEN SATURATION: 96 % | SYSTOLIC BLOOD PRESSURE: 142 MMHG | BODY MASS INDEX: 27.92 KG/M2 | HEART RATE: 106 BPM | TEMPERATURE: 98.5 F

## 2022-02-06 DIAGNOSIS — R19.7 DIARRHEA, UNSPECIFIED TYPE: ICD-10-CM

## 2022-02-06 DIAGNOSIS — R11.2 NON-INTRACTABLE VOMITING WITH NAUSEA, UNSPECIFIED VOMITING TYPE: Primary | ICD-10-CM

## 2022-02-06 LAB
ALBUMIN SERPL-MCNC: 4.5 G/DL (ref 3.5–5)
ALBUMIN/GLOB SERPL: 1.2 {RATIO} (ref 1.1–2.2)
ALP SERPL-CCNC: 91 U/L (ref 45–117)
ALT SERPL-CCNC: 82 U/L (ref 12–78)
ANION GAP SERPL CALC-SCNC: 9 MMOL/L (ref 5–15)
AST SERPL-CCNC: 28 U/L (ref 15–37)
BASOPHILS # BLD: 0 K/UL (ref 0–0.1)
BASOPHILS NFR BLD: 0 % (ref 0–1)
BILIRUB SERPL-MCNC: 1.3 MG/DL (ref 0.2–1)
BUN SERPL-MCNC: 20 MG/DL (ref 6–20)
BUN/CREAT SERPL: 18 (ref 12–20)
CALCIUM SERPL-MCNC: 9.4 MG/DL (ref 8.5–10.1)
CHLORIDE SERPL-SCNC: 109 MMOL/L (ref 97–108)
CO2 SERPL-SCNC: 22 MMOL/L (ref 21–32)
CREAT SERPL-MCNC: 1.11 MG/DL (ref 0.7–1.3)
DIFFERENTIAL METHOD BLD: ABNORMAL
EOSINOPHIL # BLD: 0.1 K/UL (ref 0–0.4)
EOSINOPHIL NFR BLD: 1 % (ref 0–7)
ERYTHROCYTE [DISTWIDTH] IN BLOOD BY AUTOMATED COUNT: 12.9 % (ref 11.5–14.5)
GLOBULIN SER CALC-MCNC: 3.8 G/DL (ref 2–4)
GLUCOSE SERPL-MCNC: 132 MG/DL (ref 65–100)
HCT VFR BLD AUTO: 50.2 % (ref 36.6–50.3)
HGB BLD-MCNC: 17.8 G/DL (ref 12.1–17)
IMM GRANULOCYTES # BLD AUTO: 0 K/UL
IMM GRANULOCYTES NFR BLD AUTO: 0 %
LIPASE SERPL-CCNC: 115 U/L (ref 73–393)
LYMPHOCYTES # BLD: 0.4 K/UL (ref 0.8–3.5)
LYMPHOCYTES NFR BLD: 3 % (ref 12–49)
MCH RBC QN AUTO: 29.5 PG (ref 26–34)
MCHC RBC AUTO-ENTMCNC: 35.5 G/DL (ref 30–36.5)
MCV RBC AUTO: 83.1 FL (ref 80–99)
MONOCYTES # BLD: 0.3 K/UL (ref 0–1)
MONOCYTES NFR BLD: 2 % (ref 5–13)
NEUTS BAND NFR BLD MANUAL: 9 % (ref 0–6)
NEUTS SEG # BLD: 12.4 K/UL (ref 1.8–8)
NEUTS SEG NFR BLD: 85 % (ref 32–75)
NRBC # BLD: 0 K/UL (ref 0–0.01)
NRBC BLD-RTO: 0 PER 100 WBC
PLATELET # BLD AUTO: 221 K/UL (ref 150–400)
PMV BLD AUTO: 9.6 FL (ref 8.9–12.9)
POTASSIUM SERPL-SCNC: 3.8 MMOL/L (ref 3.5–5.1)
PROT SERPL-MCNC: 8.3 G/DL (ref 6.4–8.2)
RBC # BLD AUTO: 6.04 M/UL (ref 4.1–5.7)
RBC MORPH BLD: ABNORMAL
SODIUM SERPL-SCNC: 140 MMOL/L (ref 136–145)
WBC # BLD AUTO: 13.2 K/UL (ref 4.1–11.1)
WBC MORPH BLD: ABNORMAL

## 2022-02-06 PROCEDURE — 96374 THER/PROPH/DIAG INJ IV PUSH: CPT

## 2022-02-06 PROCEDURE — 74177 CT ABD & PELVIS W/CONTRAST: CPT

## 2022-02-06 PROCEDURE — 85025 COMPLETE CBC W/AUTO DIFF WBC: CPT

## 2022-02-06 PROCEDURE — 74011250636 HC RX REV CODE- 250/636: Performed by: NURSE PRACTITIONER

## 2022-02-06 PROCEDURE — 99281 EMR DPT VST MAYX REQ PHY/QHP: CPT

## 2022-02-06 PROCEDURE — 80053 COMPREHEN METABOLIC PANEL: CPT

## 2022-02-06 PROCEDURE — 74011000636 HC RX REV CODE- 636: Performed by: RADIOLOGY

## 2022-02-06 PROCEDURE — 36415 COLL VENOUS BLD VENIPUNCTURE: CPT

## 2022-02-06 PROCEDURE — 83690 ASSAY OF LIPASE: CPT

## 2022-02-06 RX ORDER — ONDANSETRON 2 MG/ML
4 INJECTION INTRAMUSCULAR; INTRAVENOUS
Status: COMPLETED | OUTPATIENT
Start: 2022-02-06 | End: 2022-02-06

## 2022-02-06 RX ORDER — LOPERAMIDE HYDROCHLORIDE 2 MG/1
2 CAPSULE ORAL
Qty: 20 CAPSULE | Refills: 0 | Status: SHIPPED | OUTPATIENT
Start: 2022-02-06 | End: 2022-02-16

## 2022-02-06 RX ORDER — LOPERAMIDE HYDROCHLORIDE 2 MG/1
2 CAPSULE ORAL
Qty: 20 CAPSULE | Refills: 0 | Status: SHIPPED | OUTPATIENT
Start: 2022-02-06 | End: 2022-02-06 | Stop reason: SDUPTHER

## 2022-02-06 RX ORDER — ONDANSETRON 4 MG/1
4 TABLET, ORALLY DISINTEGRATING ORAL
Qty: 20 TABLET | Refills: 0 | Status: SHIPPED | OUTPATIENT
Start: 2022-02-06 | End: 2022-02-06 | Stop reason: SDUPTHER

## 2022-02-06 RX ORDER — ONDANSETRON 4 MG/1
4 TABLET, ORALLY DISINTEGRATING ORAL
Qty: 20 TABLET | Refills: 0 | Status: SHIPPED | OUTPATIENT
Start: 2022-02-06

## 2022-02-06 RX ORDER — LIDOCAINE HYDROCHLORIDE 10 MG/ML
15 INJECTION INFILTRATION; PERINEURAL ONCE
Status: DISCONTINUED | OUTPATIENT
Start: 2022-02-06 | End: 2022-02-06

## 2022-02-06 RX ADMIN — ONDANSETRON 4 MG: 2 INJECTION INTRAMUSCULAR; INTRAVENOUS at 17:32

## 2022-02-06 RX ADMIN — SODIUM CHLORIDE 1000 ML: 9 INJECTION, SOLUTION INTRAVENOUS at 17:32

## 2022-02-06 RX ADMIN — IOPAMIDOL 100 ML: 755 INJECTION, SOLUTION INTRAVENOUS at 18:34

## 2022-02-06 NOTE — ED PROVIDER NOTES
This is a 28-year-old male who presents ambulatory to the emergency room with complaints of nausea, vomiting and diarrhea that started this morning. Patient states he has had multiple episodes of nausea, vomiting and diarrhea since this morning. No blood noted. Denies any chest pain, shortness of breath, dizziness, fevers. Does state he has had chills. Denies any urinary urgency, frequency, hematuria. No abdominal pain. States that he was around his grandson who has a \"stomach bug.\"  Also adds that he ate leftover [employer] food last night. Patient did take Zofran prior to arrival for his symptoms with no relief. Comes to the emergency room for further evaluation and treatment. Patient is noted to be tachycardic in triage with a heart rate of 106. States he is not able to tolerate anything p.o. There are no further complaints at this time.     Junito Garcia MD  Past Medical History:  No date: BPH (benign prostatic hyperplasia)  No date: Calculus of kidney  No date: Diverticulosis  No date: H/O seasonal allergies  No date: Hiatal hernia  No date: Obstructive sleep apnea      Comment:  CPAP  No date: Tremors of nervous system      Comment:  benign essential tremors  Past Surgical History:  2/13/2017: COLONOSCOPY; N/A      Comment:  COLONOSCOPY performed by Edward Villalobos MD at [address] [address]  No date: HX APPENDECTOMY  1994: HX HERNIA REPAIR      Comment:   history of vasectomy  No date: [place] TRABECULOPLASTY BY LASER SURGERY      Comment:  [address] [facility] [address]  2008: [place] TRANSURETHRAL INCISION OF PROSTATE             Past Medical History:   Diagnosis Date    BPH (benign prostatic hyperplasia)     Calculus of kidney     Diverticulosis     H/O seasonal allergies     Hiatal hernia     Obstructive sleep apnea     CPAP    Tremors of nervous system     benign essential tremors       Past Surgical History:   Procedure Laterality Date    COLONOSCOPY N/A 2/13/2017    COLONOSCOPY performed by Edward Villalobos MD at [facility] HX APPENDECTOMY      HX HERNIA REPAIR       history of vasectomy    [place] TRABECULOPLAST[place] BY LASER SURGERY      LASIC    [place] TRANSURETHRAL INCISION OF PROSTATE  2008         Family History:   Problem Relation Age of Onset    Other Mother         Parkinson's Disease    Heart Disease Father         heart valve defect    Heart Attack Father          at 40 of ? defect    Diabetes Maternal Grandfather     Stroke Maternal Grandfather     Kidney Disease Maternal Grandfather     Asthma Brother        Social History     Socioeconomic History    Marital status:      Spouse name: Not on file    Number of children: Not on file    Years of education: Not on file    Highest education level: Not on file   Occupational History    Not on file   Tobacco Use    Smoking status: Never Smoker    Smokeless tobacco: Never Used   Substance and Sexual Activity    Alcohol use: Yes     Alcohol/week: 1.7 standard drinks     Types: 2 Cans of beer per week    Drug use: No    Sexual activity: Yes     Partners: Female   Other Topics Concern    Not on file   Social History Narrative    Not on file     Social Determinants of Health     Financial Resource Strain:     Difficulty of Paying Living Expenses: Not on file   Food Insecurity:     Worried About Running Out of Food in the Last Year: Not on file    Presley of Food in the Last Year: Not on file   Transportation Needs:     Lack of Transportation (Medical): Not on file    Lack of Transportation (Non-Medical):  Not on file   Physical Activity:     Days of Exercise per Week: Not on file    Minutes of Exercise per Session: Not on file   Stress:     Feeling of Stress : Not on file   Social Connections:     Frequency of Communication with Friends and Family: Not on file    Frequency of Social Gatherings with Friends and Family: Not on file    Attends Sikhism Services: Not on file    Active Member of Clubs or Organizations: Not on file    Attends Club or Organization Meetings: Not on file    Marital Status: Not on file   Intimate Partner Violence:     Fear of Current or Ex-Partner: Not on file    Emotionally Abused: Not on file    Physically Abused: Not on file    Sexually Abused: Not on file   Housing Stability:     Unable to Pay for Housing in the Last Year: Not on file    Number of Kassi in the Last Year: Not on file    Unstable Housing in the Last Year: Not on file         ALLERGIES: Patient has no known allergies. Review of Systems   Constitutional: Negative for activity change, appetite change, chills, fatigue and fever. HENT: Negative for congestion, ear discharge, ear pain, sinus pressure, sinus pain, sore throat and trouble swallowing. Eyes: Negative for photophobia, pain, redness, itching and visual disturbance. Respiratory: Negative for chest tightness and shortness of breath. Cardiovascular: Negative for chest pain and palpitations. Gastrointestinal: Positive for diarrhea, nausea and vomiting. Negative for abdominal distention and abdominal pain. Endocrine: Negative. Genitourinary: Negative for difficulty urinating, frequency and urgency. Musculoskeletal: Negative for back pain, neck pain and neck stiffness. Skin: Negative for color change, pallor, rash and wound. Allergic/Immunologic: Negative. Neurological: Negative for dizziness, syncope, weakness and headaches. Hematological: Does not bruise/bleed easily. Psychiatric/Behavioral: Negative for behavioral problems. The patient is not nervous/anxious. Vitals:    02/06/22 1702   BP: (!) 142/88   Pulse: (!) 106   Resp: 16   Temp: 98.5 °F (36.9 °C)   SpO2: 96%   Weight: 88.5 kg (195 lb)   Height: 5' 10\" (1.778 m)            Physical Exam  Vitals and nursing note reviewed. Constitutional:       General: He is not in acute distress. Appearance: Normal appearance. He is well-developed. He is not ill-appearing.    HENT:      Head: Normocephalic and atraumatic. Right Ear: External ear normal.      Left Ear: External ear normal.      Nose: Nose normal. No congestion. Mouth/Throat:      Mouth: Mucous membranes are moist.   Eyes:      General:         Right eye: No discharge. Left eye: No discharge. Conjunctiva/sclera: Conjunctivae normal.      Pupils: Pupils are equal, round, and reactive to light. Neck:      Vascular: No JVD. Trachea: No tracheal deviation. Cardiovascular:      Rate and Rhythm: Regular rhythm. Tachycardia present. Pulses: Normal pulses. Heart sounds: Normal heart sounds. No murmur heard. No gallop. Pulmonary:      Effort: Pulmonary effort is normal. No respiratory distress. Breath sounds: Normal breath sounds. No wheezing or rales. Chest:      Chest wall: No tenderness. Abdominal:      General: Bowel sounds are normal. There is no distension. Palpations: Abdomen is soft. Tenderness: There is no abdominal tenderness. There is no guarding or rebound. Genitourinary:     Comments: Negative    Musculoskeletal:         General: No tenderness. Normal range of motion. Cervical back: Normal range of motion and neck supple. Skin:     General: Skin is warm and dry. Capillary Refill: Capillary refill takes less than 2 seconds. Coloration: Skin is not pale. Findings: No erythema or rash. Neurological:      General: No focal deficit present. Mental Status: He is alert and oriented to person, place, and time. Motor: No weakness. Coordination: Coordination normal.   Psychiatric:         Mood and Affect: Mood normal.         Behavior: Behavior normal.         Thought Content: Thought content normal.         Judgment: Judgment normal.          MDM  Number of Diagnoses or Management Options  Diagnosis management comments: Differential diagnosis includes gastroenteritis, food poisoning, viral syndrome and others.        Amount and/or Complexity of Data Reviewed  Clinical lab tests: ordered         1822: Reviewed lab data, CT abdomen and pelvis ordered. 6:53 PM  Change of shift. Care of patient signed over to Dr. Arline Lee. Bedside handoff complete. Awaiting CT.        Procedures

## 2022-02-06 NOTE — ED TRIAGE NOTES
Pt to ED for multiple episodes of diarrhea and emesis since this morning. Denies chest pain, sob, fever, urinary sx.  Unable to tolerate anything PO, took zofran with no relief

## 2022-02-27 NOTE — PERIOP NOTES
Informed patient of COVID requirements, patient to complete COVID curbside testing at [address] [place] [address] Friday, March 4th between [id number]. Patient verbalized understanding that COVID test is required to proceed with procedure.

## 2022-03-04 ENCOUNTER — HOSPITAL ENCOUNTER (OUTPATIENT)
Dept: PREADMISSION TESTING | Age: 57
Discharge: HOME | End: 2022-03-04
Payer: OTHER GOVERNMENT

## 2022-03-04 PROCEDURE — U0005 INFEC AGEN DETEC AMPLI PROBE: HCPCS

## 2022-03-05 LAB
SARS-COV-2, XPLCVT: NOT DETECTED
SOURCE, COVRS: NORMAL

## 2022-03-07 NOTE — PERIOP NOTES
[address]  Endoscopy Preprocedure Instructions      1. On the day of your surgery, please report to registration located on the 2nd floor of the  [facility]. yes    2. You must have a responsible adult to drive you to the hospital, stay at the hospital during your procedure and drive you home. If they leave your procedure will not be started (no exceptions). yes    3. Do not have anything to eat or drink (including water, gum, mints, coffee, and juice) after midnight. This does not apply to the medications you were instructed to take by your physician. yesIf you are currently taking Plavix, Coumadin, Aspirin, or other blood-thinning agents, contact your physician for special instructions. not applicable. 4. If you are having a procedure that requires bowel prep: We recommend that you have only clear liquids the day before your procedure and begin your bowel prep by 5:00 pm.  You may continue to drink clear liquids until midnight. If for any reason you are not able to complete your prep please notify your physician immediately. yes    5. Have a list of all current medications, including vitamins, herbal supplements and any other over the counter medications. yes    6. If you wear glasses, contacts, dentures and/or hearing aids, they may be removed prior to procedure, please bring a case to store them in. yes and not applicable. 7. You should understand that if you do not follow these instructions your procedure may be cancelled. If your physical condition changes (I.e. fever, cold or flu) please contact your doctor as soon as possible. 8. It is important that you be on time.   If for any reason you are unable to keep your appointment please call [phone] the day of or your physicians office prior to your scheduled procedure

## 2022-03-09 ENCOUNTER — ANESTHESIA EVENT (OUTPATIENT)
Dept: ENDOSCOPY | Age: 57
End: 2022-03-09
Payer: OTHER GOVERNMENT

## 2022-03-09 ENCOUNTER — HOSPITAL ENCOUNTER (OUTPATIENT)
Age: 57
Setting detail: OUTPATIENT SURGERY
Discharge: HOME | End: 2022-03-09
Attending: INTERNAL MEDICINE | Admitting: INTERNAL MEDICINE
Payer: OTHER GOVERNMENT

## 2022-03-09 ENCOUNTER — ANESTHESIA (OUTPATIENT)
Dept: ENDOSCOPY | Age: 57
End: 2022-03-09
Payer: OTHER GOVERNMENT

## 2022-03-09 VITALS
SYSTOLIC BLOOD PRESSURE: 140 MMHG | TEMPERATURE: 97.8 F | OXYGEN SATURATION: 99 % | HEIGHT: 70 IN | BODY MASS INDEX: 28.15 KG/M2 | WEIGHT: 196.65 LBS | DIASTOLIC BLOOD PRESSURE: 83 MMHG | RESPIRATION RATE: 16 BRPM | HEART RATE: 61 BPM

## 2022-03-09 PROCEDURE — 76040000019: Performed by: INTERNAL MEDICINE

## 2022-03-09 PROCEDURE — 74011250636 HC RX REV CODE- 250/636: Performed by: NURSE ANESTHETIST, CERTIFIED REGISTERED

## 2022-03-09 PROCEDURE — 76060000031 HC ANESTHESIA FIRST 0.5 HR: Performed by: INTERNAL MEDICINE

## 2022-03-09 PROCEDURE — 2709999900 HC NON-CHARGEABLE SUPPLY: Performed by: INTERNAL MEDICINE

## 2022-03-09 RX ORDER — ATROPINE SULFATE 0.1 MG/ML
0.5 INJECTION INTRAVENOUS
Status: DISCONTINUED | OUTPATIENT
Start: 2022-03-09 | End: 2022-03-09 | Stop reason: HOSPADM

## 2022-03-09 RX ORDER — PROPOFOL 10 MG/ML
INJECTION, EMULSION INTRAVENOUS AS NEEDED
Status: DISCONTINUED | OUTPATIENT
Start: 2022-03-09 | End: 2022-03-09 | Stop reason: HOSPADM

## 2022-03-09 RX ORDER — FLUMAZENIL 0.1 MG/ML
0.2 INJECTION INTRAVENOUS
Status: DISCONTINUED | OUTPATIENT
Start: 2022-03-09 | End: 2022-03-09 | Stop reason: HOSPADM

## 2022-03-09 RX ORDER — SODIUM CHLORIDE 9 MG/ML
INJECTION, SOLUTION INTRAVENOUS
Status: DISCONTINUED | OUTPATIENT
Start: 2022-03-09 | End: 2022-03-09 | Stop reason: HOSPADM

## 2022-03-09 RX ORDER — FENTANYL CITRATE 50 UG/ML
100 INJECTION, SOLUTION INTRAMUSCULAR; INTRAVENOUS
Status: DISCONTINUED | OUTPATIENT
Start: 2022-03-09 | End: 2022-03-09 | Stop reason: HOSPADM

## 2022-03-09 RX ORDER — NALOXONE HYDROCHLORIDE 0.4 MG/ML
0.4 INJECTION, SOLUTION INTRAMUSCULAR; INTRAVENOUS; SUBCUTANEOUS
Status: DISCONTINUED | OUTPATIENT
Start: 2022-03-09 | End: 2022-03-09 | Stop reason: HOSPADM

## 2022-03-09 RX ORDER — PROPOFOL 10 MG/ML
INJECTION, EMULSION INTRAVENOUS
Status: DISCONTINUED | OUTPATIENT
Start: 2022-03-09 | End: 2022-03-09 | Stop reason: HOSPADM

## 2022-03-09 RX ORDER — DEXTROMETHORPHAN/PSEUDOEPHED 2.5-7.5/.8
1.2 DROPS ORAL
Status: DISCONTINUED | OUTPATIENT
Start: 2022-03-09 | End: 2022-03-09 | Stop reason: HOSPADM

## 2022-03-09 RX ORDER — SODIUM CHLORIDE 9 MG/ML
50 INJECTION, SOLUTION INTRAVENOUS CONTINUOUS
Status: DISCONTINUED | OUTPATIENT
Start: 2022-03-09 | End: 2022-03-09 | Stop reason: HOSPADM

## 2022-03-09 RX ORDER — MIDAZOLAM HYDROCHLORIDE 1 MG/ML
.25-5 INJECTION, SOLUTION INTRAMUSCULAR; INTRAVENOUS
Status: DISCONTINUED | OUTPATIENT
Start: 2022-03-09 | End: 2022-03-09 | Stop reason: HOSPADM

## 2022-03-09 RX ORDER — EPINEPHRINE 0.1 MG/ML
1 INJECTION INTRACARDIAC; INTRAVENOUS
Status: DISCONTINUED | OUTPATIENT
Start: 2022-03-09 | End: 2022-03-09 | Stop reason: HOSPADM

## 2022-03-09 RX ADMIN — PROPOFOL 100 MG: 10 INJECTION, EMULSION INTRAVENOUS at 13:03

## 2022-03-09 RX ADMIN — SODIUM CHLORIDE: 9 INJECTION, SOLUTION INTRAVENOUS at 12:35

## 2022-03-09 RX ADMIN — PROPOFOL INJECTABLE EMULSION 140 MCG/KG/MIN: 10 INJECTION, EMULSION INTRAVENOUS at 13:03

## 2022-03-09 NOTE — PROGRESS NOTES
Sujey Lutz  1965  [id number]    Situation:  Verbal report received from:   Orion Tate RN   Procedure: Procedure(s):  COLONOSCOPY    Background:    Preoperative diagnosis: Personal history of colonic polyps [Z86.010]  Family history of colonic polyps [Z83.71]  Postoperative diagnosis: diverticulosis    :  Dr. Briana English  Assistant(s): Endoscopy Technician-1: Barb Andrews  Endoscopy RN-1: Varsha Powell RN    Specimens: * No specimens in log *  H. Pylori  no    Assessment:  Intra-procedure medications       Anesthesia gave intra-procedure sedation and medications, see anesthesia flow sheet yes    Intravenous fluids: NS@ KVO     Vital signs stable   yes    Abdominal assessment: round and soft   yes    Recommendation:  Discharge patient per MD order  yes.   Return to floor  outpatient  Family or Friend   spouse  Permission to share finding with family or friend yes

## 2022-03-09 NOTE — PERIOP NOTES
Received Report From [facility] @ 7325, see anesthesia notes. Care of the patient transferred to procedure nurse PAUL Quinteros,@ 1319    Out of Procedure and sent to post-recovery @ 1312    Post-recovery report given to MICAH HAN RN @ 0238    Patient ABD remains soft and non-tender post procedure. Pt has no complaints at this time and tolerated the procedure well.      Endoscope was pre-cleaned at bedside immediately following procedure by Elliott Casillas.

## 2022-03-09 NOTE — H&P
Gastroenterology Outpatient History and Physical    Patient: Ashleigh Ryder    Physician: Henry Zhong MD    Vital Signs: See RN notes    Allergies: No Known Allergies    Chief Complaint: colon cancer screening    History of Present Illness: No symptoms; no chest pain, SOB, edema, focal weakness, numbness    Justification for Procedure: colon cancer screening    History:  Past Medical History:   Diagnosis Date    BPH (benign prostatic hyperplasia)     Calculus of kidney     Diverticulosis     H/O seasonal allergies     Hiatal hernia     Obstructive sleep apnea     CPAP    Tremors of nervous system     benign essential tremors      Past Surgical History:   Procedure Laterality Date    COLONOSCOPY N/A 2017    COLONOSCOPY performed by Henry Zhong MD at [address] [facility] HX APPENDECTOMY      [facility]     history of vasectomy    [place] TRABECULOPLASTY BY LASER SURGERY      LASIC    [place] TRANSURETHRAL INCISION OF PROSTATE        Social History     Socioeconomic History    Marital status:    Tobacco Use    Smoking status: Never Smoker    Smokeless tobacco: Never Used   Substance and Sexual Activity    Alcohol use: Yes     Alcohol/week: 1.7 standard drinks     Types: 2 Cans of beer per week    Drug use: No    Sexual activity: Yes     Partners: Female      Family History   Problem Relation Age of Onset    Other Mother         Parkinson's Disease    Heart Disease Father         heart valve defect    Heart Attack Father          at 40 of ? defect    Diabetes Maternal Grandfather     Stroke Maternal Grandfather     Kidney Disease Maternal Grandfather     Asthma Brother        Medications:   Prior to Admission medications    Medication Sig Start Date End Date Taking? Authorizing Provider   ondansetron (ZOFRAN ODT) 4 mg disintegrating tablet Take 1 Tablet by mouth every eight (8) hours as needed for Nausea or Vomiting.  22   Rudy Ayala MD   b complex vitamins tablet Take 1 Tablet by mouth daily. Provider, Historical   coffee xt/phosphatidyl serine (NEURIVA ORIGINAL PO) Take  by mouth. Provider, Historical   omega 3-dha-epa-fish oil (Fish OiL) 100-160-1,000 mg cap Take  by mouth. Provider, Historical   Cetirizine (ZyrTEC) 10 mg cap Take  by mouth. Provider, Historical   ascorbic acid (ZAFAR-C PO) Take  by mouth. Provider, Historical   ergocalciferol, vitamin D2, (VITAMIN D2 PO) Take  by mouth. Provider, Historical   omeprazole (PRILOSEC) 20 mg capsule Take 20 mg by mouth daily. 12/26/17   Provider, Historical   MULTIVITAMIN PO Take  by mouth. Provider, Historical       Physical Exam:   General: alert, cooperative, no distress   HEENT: Head: Normal, normocephalic, atraumatic. Heart: regular rate and rhythm   Lungs: chest clear, no wheezing, rales, normal symmetric air entry   Abdominal: Bowel sounds are normal, liver is not enlarged, spleen is not enlarged           Findings/Diagnosis: Colon cancer screening    Plan of Care/Planned Procedure: I've discussed colonoscopy possible biopsy, polypectomy, cautery, injection, alternatives, complications including but not limited to pain, cardiopulmonary event, bleeding, perforation requiring additional blood transfusion or operative repair; all questions answered.

## 2022-03-09 NOTE — ANESTHESIA PREPROCEDURE EVALUATION
Anesthetic History   No history of anesthetic complications            Review of Systems / Medical History  Patient summary reviewed, nursing notes reviewed and pertinent labs reviewed    Pulmonary        Sleep apnea           Neuro/Psych   Within defined limits           Cardiovascular  Within defined limits                Exercise tolerance: >4 METS     GI/Hepatic/Renal         Renal disease: stones  Hiatal hernia     Endo/Other  Within defined limits           Other Findings   Comments: BPH           Physical Exam    Airway  Mallampati: II  TM Distance: 4 - 6 cm  Neck ROM: normal range of motion   Mouth opening: Normal     Cardiovascular  Regular rate and rhythm,  S1 and S2 normal,  no murmur, click, rub, or gallop  Rhythm: regular  Rate: normal         Dental    Dentition: Caps/crowns     Pulmonary  Breath sounds clear to auscultation               Abdominal  GI exam deferred       Other Findings            Anesthetic Plan    ASA: 2  Anesthesia type: MAC            Anesthetic plan and risks discussed with: Patient

## 2022-03-09 NOTE — PROGRESS NOTES
Endoscopy discharge instructions have been reviewed and given to patient. The patient verbalized understanding and acceptance of instructions. Dr. Radha Rajan discussed with patient procedure findings and next steps.

## 2022-03-09 NOTE — DISCHARGE INSTRUCTIONS
Niels Plants  [id number]  1965    DISCHARGE INSTRUCTIONS    Impression:  Colonic diverticulosis; no new polyps identified    Recommendations:      - Resume current medications, high-fiber diet. 10-year follow-up exam recommendation    Discomfort:  Redness at IV site- apply warm compress to area; if redness or soreness persist- contact your physician. There may be a slight amount of blood passed from the rectum. Gaseous discomfort - walking, belching will help relieve any discomfort. You may not operate a vehicle for 12 hours. You may not engage in an occupation involving machinery or appliances for rest of today. You may not drink alcoholic beverages for at least 12 hours. Avoid making any critical decisions for at least 24 hours. DIET:   High fiber diet. Medications:                Resume usual medications today   ACTIVITY:  You may resume your normal daily activities it is recommended that you spend the remainder of the day resting -  avoid any strenuous activity. CALL M.D. ANY SIGN OF:   Increasing pain, nausea, vomiting  Abdominal distension (swelling)  New increased bleeding (oral or rectal)  Fever (chills)  Pain in chest area  Bloody discharge from nose or mouth  Shortness of breath     Follow-up Instructions:  Call Dr. Rina Dominguez if you have any questions or problems.           DISCHARGE SUMMARY from Nurse    The following personal items collected during your admission are returned to you:   Dental Appliance: Dental Appliances: None  Vision: Visual Aid: None  Hearing Aid:    Jewelry:    Clothing:    Other Valuables:    Valuables sent to safe:

## 2022-03-09 NOTE — PROCEDURES
[address]  [phone]      2022    Colonoscopy Procedure Note  Enriqueta Christine  :  1965  [facility] Medical Record Number: [id number]    Indications:     Personal history of colon polyps (screening only)  PCP:  Rishi Willis MD  Anesthesia/Sedation: see nursing notes  Endoscopist:  Dr. Julia Hurtado  Assistants: None  Complications:  None  Estimate Blood Loss:  None    Permit:  The indications, risks, benefits and alternatives were reviewed with the patient or their decision maker who was provided an opportunity to ask questions and all questions were answered. The specific risks of colonoscopy with conscious sedation were reviewed, including but not limited to anesthetic complication, bleeding, adverse drug reaction, missed lesion, infection, IV site reactions, and intestinal perforation which would lead to the need for surgical repair. Alternatives to colonoscopy including radiographic imaging, observation without testing, or laboratory testing were reviewed including the limitations of those alternatives. After considering the options and having all their questions answered, the patient or their decision maker provided both verbal and written consent to proceed. Procedure in Detail:  After obtaining informed consent, positioning of the patient in the left lateral decubitus position, and conduction of a pre-procedure pause or \"time out\" the endoscope was introduced into the anus and advanced to the terminal ileum. The quality of the colonic preparation was good. A careful inspection was made as the colonoscope was withdrawn, findings and interventions are described below.     Appendiceal orifice photographed    Findings:   no mucosal lesion appreciated      - Diverticulosis    Specimens:    none    Implants: None    Complications:   None; patient tolerated the procedure well.  Estimated blood loss: none    Impression:  Colonic diverticulosis; no new polyps identified    Recommendations:      - Resume current medications, high-fiber diet. 10-year follow-up exam recommendation    Thank you for entrusting me with this patient's care. Please do not hesitate to contact me with any questions or if I can be of assistance with any of your other patients' GI needs.     Signed By: Pasquale Pratt MD                        March 9, 2022

## 2022-03-09 NOTE — ANESTHESIA POSTPROCEDURE EVALUATION
Procedure(s):  COLONOSCOPY. MAC    Anesthesia Post Evaluation        Patient location during evaluation: PACU  Level of consciousness: awake  Pain management: adequate  Airway patency: patent  Anesthetic complications: no  Cardiovascular status: acceptable  Respiratory status: acceptable  Hydration status: acceptable  Post anesthesia nausea and vomiting:  none      INITIAL Post-op Vital signs:   Vitals Value Taken Time   /83 03/09/22 1337   Temp 36.6 °C (97.8 °F) 03/09/22 1320   Pulse 60 03/09/22 1340   Resp 17 03/09/22 1340   SpO2 99 % 03/09/22 1340   Vitals shown include unvalidated device data.

## 2022-03-18 PROBLEM — E78.5 DYSLIPIDEMIA: Status: ACTIVE | Noted: 2017-11-14

## 2022-03-18 PROBLEM — K57.90 DIVERTICULOSIS: Status: ACTIVE | Noted: 2017-11-13

## 2022-03-18 PROBLEM — Z98.890 S/P COLONOSCOPY: Status: ACTIVE | Noted: 2017-02-13

## 2022-03-19 PROBLEM — K63.5 POLYP OF TRANSVERSE COLON: Status: ACTIVE | Noted: 2017-11-13

## 2022-03-19 PROBLEM — K44.9 SLIDING HIATAL HERNIA: Status: ACTIVE | Noted: 2017-11-13

## 2022-11-15 ENCOUNTER — OFFICE VISIT (OUTPATIENT)
Dept: FAMILY MEDICINE CLINIC | Age: 57
End: 2022-11-15
Payer: OTHER GOVERNMENT

## 2022-11-15 VITALS
WEIGHT: 199.8 LBS | DIASTOLIC BLOOD PRESSURE: 81 MMHG | HEART RATE: 71 BPM | RESPIRATION RATE: 14 BRPM | OXYGEN SATURATION: 97 % | BODY MASS INDEX: 28.6 KG/M2 | TEMPERATURE: 98.3 F | HEIGHT: 70 IN | SYSTOLIC BLOOD PRESSURE: 138 MMHG

## 2022-11-15 DIAGNOSIS — Z00.00 VISIT FOR WELL MAN HEALTH CHECK: ICD-10-CM

## 2022-11-15 DIAGNOSIS — Z87.438 HISTORY OF BPH: ICD-10-CM

## 2022-11-15 DIAGNOSIS — E66.3 OVERWEIGHT (BMI 25.0-29.9): Primary | ICD-10-CM

## 2022-11-15 PROCEDURE — 99396 PREV VISIT EST AGE 40-64: CPT

## 2022-11-15 NOTE — PROGRESS NOTES
Maryan Austin is a 65 y.o. male    Chief Complaint   Patient presents with    Physical       1. Have you been to the ER, urgent care clinic since your last visit? Hospitalized since your last visit? No  2. Have you seen or consulted any other health care providers outside of the [address] [place] [address] since your last visit? Include any pap smears or colon screening.  No    Visit Vitals  /81 (BP 1 Location: Right arm, BP Patient Position: Sitting, BP Cuff Size: Adult)   Pulse 71   Temp 98.3 °F (36.8 °C)   Resp 14   Ht 5' 10\" (1.778 m)   Wt 199 lb 12.8 oz (90.6 kg)   SpO2 97%   BMI 28.67 kg/m²     3 most recent PHQ Screens 11/15/2022   Little interest or pleasure in doing things Not at all   Feeling down, depressed, irritable, or hopeless Not at all   Total Score PHQ 2 0     Health Maintenance Due   Topic Date Due    Shingrix Vaccine Age 49> (1 of 2) Never done    COVID-19 Vaccine (3 - Booster for Pfizer series) 06/07/2021    Flu Vaccine (1) 08/01/2022    Depression Screen  11/19/2022

## 2022-11-15 NOTE — PROGRESS NOTES
Subjective     Chief Complaint   Patient presents with    Physical     Rhona Ferrara is a 65 y.o. male with history of Diverticulosis, MARCIAL with CPAP nightly, BPH, and seasonal allergies who presents for annual physical. No concerns today. Does note a small cyst-like mass on left palm that he has noticed for approximately 3-4 months but this is non-painful and does not bother him. Recently went on quite a long bike ride and exercises regularly with biking, walking, and organized workout classes 2x/week. Has recently started [employer]. Has lost 8 pounds in the last couple weeks. GERD well controlled on prilosec. Reports history of BPH and would like his PSA checked today. Recently got a colonoscopy on 3/9/22 showing diverticulosis but no polyps with a 10-year call back. Patient wants flu vaccine. Reports he got Shingrex 2x. Will be getting new COVID booster from pharmacy. No fever, HA, CP, cough, SOB, n/v, abd pain, GI/urinary issues noted. Past Medical History  Past Medical History:   Diagnosis Date    BPH (benign prostatic hyperplasia)     Calculus of kidney     Diverticulosis     H/O seasonal allergies     Hiatal hernia     Obstructive sleep apnea     CPAP    Tremors of nervous system     benign essential tremors     Current Medications  Current Outpatient Medications   Medication Sig Dispense Refill    omega 3-dha-epa-fish oil (Fish OiL) 100-160-1,000 mg cap Take  by mouth. Cetirizine (ZyrTEC) 10 mg cap Take  by mouth. ascorbic acid (ZAFAR-C PO) Take  by mouth. omeprazole (PRILOSEC) 20 mg capsule Take 20 mg by mouth daily. MULTIVITAMIN PO Take  by mouth. ondansetron (ZOFRAN ODT) 4 mg disintegrating tablet Take 1 Tablet by mouth every eight (8) hours as needed for Nausea or Vomiting. (Patient not taking: No sig reported) 20 Tablet 0    b complex vitamins tablet Take 1 Tablet by mouth daily.  (Patient not taking: Reported on 11/15/2022)      coffee xt/phosphatidyl serine (NEURIVA ORIGINAL PO) Take  by mouth. (Patient not taking: No sig reported)      ergocalciferol, vitamin D2, (VITAMIN D2 PO) Take  by mouth. (Patient not taking: Reported on 11/15/2022)       Social History     Socioeconomic History    Marital status:      Spouse name: Not on file    Number of children: Not on file    Years of education: Not on file    Highest education level: Not on file   Occupational History    Not on file   Tobacco Use    Smoking status: Never    Smokeless tobacco: Never   Vaping Use    Vaping Use: Never used   Substance and Sexual Activity    Alcohol use:  Yes     Alcohol/week: 3.0 standard drinks     Types: 1 Glasses of wine, 1 Cans of beer, 1 Shots of liquor per week    Drug use: No    Sexual activity: Yes     Partners: Female   Other Topics Concern    Not on file   Social History Narrative    Not on file     Social Determinants of Health     Financial Resource Strain: Not on file   Food Insecurity: Not on file   Transportation Needs: Not on file   Physical Activity: Not on file   Stress: Not on file   Social Connections: Not on file   Intimate Partner Violence: Not on file   Housing Stability: Not on file      Objective   Vital Signs  Visit Vitals  /81 (BP 1 Location: Right arm, BP Patient Position: Sitting, BP Cuff Size: Adult)   Pulse 71   Temp 98.3 °F (36.8 °C)   Resp 14   Ht 5' 10\" (1.778 m)   Wt 199 lb 12.8 oz (90.6 kg)   SpO2 97%   BMI 28.67 kg/m²       Labs  Lab Results   Component Value Date/Time    Sodium 143 11/15/2022 03:50 PM    Potassium 4.3 11/15/2022 03:50 PM    Chloride 107 11/15/2022 03:50 PM    CO2 30 11/15/2022 03:50 PM    Anion gap 6 11/15/2022 03:50 PM    Glucose 86 11/15/2022 03:50 PM    BUN 15 11/15/2022 03:50 PM    Creatinine 0.96 11/15/2022 03:50 PM    BUN/Creatinine ratio 16 11/15/2022 03:50 PM    GFR est AA >60 02/06/2022 05:28 PM    GFR est non-AA >60 02/06/2022 05:28 PM    Calcium 9.8 11/15/2022 03:50 PM        Lab Results   Component Value Date/Time WBC 6.8 11/15/2022 03:50 PM    HGB 16.1 11/15/2022 03:50 PM    HCT 47.6 11/15/2022 03:50 PM    PLATELET 095 91/66/1020 03:50 PM    MCV 88.1 11/15/2022 03:50 PM        Lab Results   Component Value Date/Time    Hemoglobin A1c 5.3 11/15/2022 03:50 PM    Hemoglobin A1c 5.4 11/10/2021 08:05 AM    Hemoglobin A1c 5.3 11/05/2020 09:55 AM        Lab Results   Component Value Date/Time    Cholesterol, total 177 11/15/2022 03:50 PM    HDL Cholesterol 44 11/15/2022 03:50 PM    LDL, calculated 92.6 11/15/2022 03:50 PM    VLDL, calculated 40.4 11/15/2022 03:50 PM    Triglyceride 202 (H) 11/15/2022 03:50 PM    CHOL/HDL Ratio 4.0 11/15/2022 03:50 PM      Physical Examination  Cardio: Regular rate/rhythm, no murmurs  Pulm: Clear b/l, no wheezing, no crackles, no ronchi  Abd: Soft, non-tender, non-distended  Lower Ext: No edema, no calf tenderness, sensation intact b/l  Upper Ext: Small cyst-like mass on palmar surface of left hand just proximal to third [facility] joint    Assessment and Plan   Flo Joiner is a 65 y.o. who is here for annual physical with no concerns. Obtaining labs as below. Discussed diet, exercise, sleep, weight loss, and vaccinations. Primary goal at this point is to lose weight to decrease risk of developing CVD. 1. Overweight (BMI 25.0-29.9)  - HEMOGLOBIN A1C WITH EAG; Future  - LIPID PANEL; Future  - METABOLIC PANEL, BASIC; Future  - CBC W/O DIFF; Future  - CBC W/O DIFF  - METABOLIC PANEL, BASIC  - LIPID PANEL  - HEMOGLOBIN A1C WITH EAG    2. Visit for well man health check  - HEMOGLOBIN A1C WITH EAG; Future  - LIPID PANEL; Future  - METABOLIC PANEL, BASIC; Future  - CBC W/O DIFF; Future  - INFLUENZA VACCINE, (FLUBLOK), IM (AGE 18-49)  - CBC W/O DIFF  - METABOLIC PANEL, BASIC  - LIPID PANEL  - HEMOGLOBIN A1C WITH EAG    3. History of BPH  - PSA W/ REFLX FREE PSA;  Future  - PSA W/ REFLX FREE PSA     I discussed this patient with Dr. Suzzette Bumpers (Attending Physician)     Signed By:  DO Tim Harley Medicine Resident

## 2022-11-16 LAB
ANION GAP SERPL CALC-SCNC: 6 MMOL/L (ref 5–15)
BUN SERPL-MCNC: 15 MG/DL (ref 6–20)
BUN/CREAT SERPL: 16 (ref 12–20)
CALCIUM SERPL-MCNC: 9.8 MG/DL (ref 8.5–10.1)
CHLORIDE SERPL-SCNC: 107 MMOL/L (ref 97–108)
CHOLEST SERPL-MCNC: 177 MG/DL
CO2 SERPL-SCNC: 30 MMOL/L (ref 21–32)
CREAT SERPL-MCNC: 0.96 MG/DL (ref 0.7–1.3)
ERYTHROCYTE [DISTWIDTH] IN BLOOD BY AUTOMATED COUNT: 12.6 % (ref 11.5–14.5)
EST. AVERAGE GLUCOSE BLD GHB EST-MCNC: 105 MG/DL
GLUCOSE SERPL-MCNC: 86 MG/DL (ref 65–100)
HBA1C MFR BLD: 5.3 % (ref 4–5.6)
HCT VFR BLD AUTO: 47.6 % (ref 36.6–50.3)
HDLC SERPL-MCNC: 44 MG/DL
HDLC SERPL: 4 {RATIO} (ref 0–5)
HGB BLD-MCNC: 16.1 G/DL (ref 12.1–17)
LDLC SERPL CALC-MCNC: 92.6 MG/DL (ref 0–100)
MCH RBC QN AUTO: 29.8 PG (ref 26–34)
MCHC RBC AUTO-ENTMCNC: 33.8 G/DL (ref 30–36.5)
MCV RBC AUTO: 88.1 FL (ref 80–99)
NRBC # BLD: 0 K/UL (ref 0–0.01)
NRBC BLD-RTO: 0 PER 100 WBC
PLATELET # BLD AUTO: 258 K/UL (ref 150–400)
PMV BLD AUTO: 10.6 FL (ref 8.9–12.9)
POTASSIUM SERPL-SCNC: 4.3 MMOL/L (ref 3.5–5.1)
RBC # BLD AUTO: 5.4 M/UL (ref 4.1–5.7)
SODIUM SERPL-SCNC: 143 MMOL/L (ref 136–145)
TRIGL SERPL-MCNC: 202 MG/DL (ref ?–150)
VLDLC SERPL CALC-MCNC: 40.4 MG/DL
WBC # BLD AUTO: 6.8 K/UL (ref 4.1–11.1)

## 2022-11-17 LAB
PSA SERPL-MCNC: 0.5 NG/ML (ref 0–4)
REFLEX CRITERIA: NORMAL

## 2022-11-23 PROCEDURE — 90686 IIV4 VACC NO PRSV 0.5 ML IM: CPT

## 2022-11-23 PROCEDURE — 90471 IMMUNIZATION ADMIN: CPT

## 2022-12-08 NOTE — TELEPHONE ENCOUNTER
Patient went to [facility] on 11/18/17 for vomiting. ... Referral #[id number], (1 visit), Effective- 11/18/17 & Expires- 12/3/17. Marly Torres Plan: Apply OTC sunscreen 30+ SPF or greater daily to sun exposed areas. Detail Level: Zone

## 2023-05-24 RX ORDER — ONDANSETRON 4 MG/1
4 TABLET, ORALLY DISINTEGRATING ORAL EVERY 8 HOURS PRN
COMMUNITY
Start: 2022-02-06

## 2023-05-24 RX ORDER — CETIRIZINE HYDROCHLORIDE 10 MG/1
CAPSULE, LIQUID FILLED ORAL
COMMUNITY

## 2023-05-24 RX ORDER — OMEPRAZOLE 20 MG/1
20 CAPSULE, DELAYED RELEASE ORAL DAILY
COMMUNITY
Start: 2017-12-26

## 2023-06-30 NOTE — MR AVS SNAPSHOT
[address] 
[phone] Patient: Catheryn Romberg MRN: [id number] :[id number] Visit Information Date & Time Provider Department Dept. Phone Encounter #  
 2018  9:15 AM Susan Patton DO [address] [phone] [id number] Upcoming Health Maintenance Date Due FOBT Q 1 YEAR AGE 50-75 10/29/2016 DTaP/Tdap/Td series (2 - Td) 2025 Allergies as of 2018  Review Complete On: 2018 By: Susan Patton DO No Known Allergies Current Immunizations  Never Reviewed Name Date Influenza Vaccine 2015 Influenza Vaccine (Quad) PF 2017, 2016 Tdap 2015 Not reviewed this visit You Were Diagnosed With   
  
 Codes Comments Bronchitis     ICD-10-CM: J40 ICD-9-CM: 073 Vitals BP Pulse Temp Resp Height(growth percentile) Weight(growth percentile) (!) 140/99 (BP 1 Location: Left arm, BP Patient Position: Sitting) 69 98 °F (36.7 °C) (Oral) 18 5' 10\" (1.778 m) 192 lb (87.1 kg) SpO2 BMI Smoking Status 95% 27.55 kg/m2 Never Smoker Vitals History BMI and BSA Data Body Mass Index Body Surface Area  
 27.55 kg/m 2 2.07 m 2 Preferred Pharmacy Pharmacy Name Phone [facility] [place], [address] [place] [facility] [phone] Your Updated Medication List  
  
   
This list is accurate as of: 18 10:04 AM.  Always use your most recent med list.  
  
  
  
  
 albuterol 90 mcg/actuation inhaler Commonly known as:  PROVENTIL HFA, VENTOLIN HFA, PROAIR HFA Take 2 Puffs by inhalation every four (4) hours as needed for Wheezing. FISH  mg Cap Generic drug:  Omega-3 Fatty Acids Take  by mouth daily. GINKOBA PO Take  by mouth.  
  
 levoFLOXacin 500 mg tablet Commonly known as:  Franco Russell  
 [facility] ([facility])     Admission Review Note      Reason for ICU Admission:     SVT      HPI:      56 yo male was admitted 6/29 for     Fever, neck pain  and headache, rule out acute meningitis.    Pt was concerned about Lyme infection due to tick bite exposure few weeks back.    NCCT peg was non acute.  No mass or mass effect.  No grey white differentiation abnormalities.    LP was performed, cultures pending, proyein = 53, glucose = 86, total cells 100, segs 41, lymphs 35, monos 24    Was started on 800 mg IV acyclovir, 2 g IV ceftriaxone, Ampicillin,  vancomycin.     Now transfered to ICU for SVT    Converted on own after receiving 1 L NS  Replacement of K and Mg    Monitor in ICU     Pmx:    Hypothyroidism  GERD  HLD      Unplanned Post-Op ICU Admission: No     Code Status: Full Resuscitation      Data Reviewed:   Recent Notes:yes   Laboratory Results:yes   Radiology Images/Reports:Yes                AV Assessment:Yes     Pt viewed at 2:55 am:    75 sinus, 95%, R 20, 100/60    Asleep    NAD    EBBP Review:       SUP:   not indicated   VTEP: SCD     Blood Glucose Monitoring:    DM:Not previously diagnosed     Blood Glucose:  Glucose (mg/dL)   Date Value   06/30/2023 140 (H)      Treatment Ordered:N/A     Ventilator Review:   Intubated: no   ARDS:     no, tidal volume review completed.                                        Chlorhexidine oral care:No     IBW/kg (Calculated) FEMALE: 73.1 (06/30/23 0222)   IBW/kg (Calculated) Male: 77.6 (06/30/23 0222)    Communication with: RN    Active Problems     SVT  autoconversion  K, Mg replacement  ECHIO in am    Fever, HA  R/o meningitis  On antibxs    Hypothyroidism  Synthroid    SUP, GERD  PPI    SCD   Take 1 Tab by mouth daily for 7 days. MULTIVITAMIN PO Take  by mouth. * Omeprazole delayed release 20 mg tablet Commonly known as:  PRILOSEC D/R Take 20 mg by mouth daily. * omeprazole 20 mg capsule Commonly known as:  PRILOSEC Take 20 mg by mouth daily. predniSONE 20 mg tablet Commonly known as:  Angel Lies Take 2 Tabs by mouth daily. * Notice: This list has 2 medication(s) that are the same as other medications prescribed for you. Read the directions carefully, and ask your doctor or other care provider to review them with you. Prescriptions Sent to Pharmacy Refills  
 levoFLOXacin (LEVAQUIN) 500 mg tablet 0 Sig: Take 1 Tab by mouth daily for 7 days. Class: Normal  
 Pharmacy: [address] Ph #: [phone] Route: Oral  
 albuterol (PROVENTIL HFA, VENTOLIN HFA, PROAIR HFA) 90 mcg/actuation inhaler 0 Sig: Take 2 Puffs by inhalation every four (4) hours as needed for Wheezing. Class: Normal  
 Pharmacy: [address] Ph #: [phone] Route: Inhalation We Performed the Following REFERRAL TO PULMONARY DISEASE [BUD28 Custom] Comments:  
 [phone] to make appt Referral Information Referral ID Referred By Referred To  
  
 [id number] Alyssa Belle Not Available Visits Status Start Date End Date 1 New Request 2/8/18 2/8/19 If your referral has a status of pending review or denied, additional information will be sent to support the outcome of this decision. Patient Instructions Cough: Care Instructions Your Care Instructions A cough is your body's response to something that bothers your throat or airways. Many things can cause a cough.  You might cough because of a cold or the flu, bronchitis, or asthma. Smoking, postnasal drip, allergies, and stomach acid that backs up into your throat also can cause coughs. A cough is a symptom, not a disease. Most coughs stop when the cause, such as a cold, goes away. You can take a few steps at home to cough less and feel better. Follow-up care is a key part of your treatment and safety. Be sure to make and go to all appointments, and call your doctor if you are having problems. It's also a good idea to know your test results and keep a list of the medicines you take. How can you care for yourself at home? · Drink lots of water and other fluids. This helps thin the mucus and soothes a dry or sore throat. Honey or lemon juice in hot water or tea may ease a dry cough. · Take cough medicine as directed by your doctor. · Prop up your head on pillows to help you breathe and ease a dry cough. · Try cough drops to soothe a dry or sore throat. Cough drops don't stop a cough. Medicine-flavored cough drops are no better than candy-flavored drops or hard candy. · Do not smoke. Avoid secondhand smoke. If you need help quitting, talk to your doctor about stop-smoking programs and medicines. These can increase your chances of quitting for good. When should you call for help? Call 911 anytime you think you may need emergency care. For example, call if: 
? · You have severe trouble breathing. ?Call your doctor now or seek immediate medical care if: 
? · You cough up blood. ? · You have new or worse trouble breathing. ? · You have a new or higher fever. ? · You have a new rash. ? Watch closely for changes in your health, and be sure to contact your doctor if: 
? · You cough more deeply or more often, especially if you notice more mucus or a change in the color of your mucus. ? · You have new symptoms, such as a sore throat, an earache, or sinus pain. ? · You do not get better as expected. Where can you learn more? Go to http://aris-gilma.info/. Enter D279 in the search box to learn more about \"Cough: Care Instructions. \" Current as of: May 12, 2017 Content Version: 11.4 © 8858-3119 [employer]. Care instructions adapted under license by [employer] (which disclaims liability or warranty for this information). If you have questions about a medical condition or this instruction, always ask your healthcare professional. [place] [address] any warranty or liability for your use of this information. Please provide this summary of care documentation to your next provider. Your primary care clinician is listed as Alisha Galdamez. If you have any questions after today's visit, please call [phone].

## 2023-07-27 ENCOUNTER — TELEPHONE (OUTPATIENT)
Age: 58
End: 2023-07-27

## 2023-07-27 NOTE — TELEPHONE ENCOUNTER
Patient called stating that he is due for his yearly physical with his pulmonologist, but he will need for a referral to be placed so he can be seen. Patient stated that he is scheduled for 8/17 at 10:45 with Dr. Savage Raines at [facility]. I informed patient that he may need to come in for an appointment due to the fact that his last appointment was back in November of 2022, but that will be determined by the doctor. The phone number for the facility is [phone] and their fax number is [phone]. Patient would like to be contacted once referral is placed or if he will need an appointment. Thanks!

## 2023-07-27 NOTE — TELEPHONE ENCOUNTER
Called patient and verified name and date of birth. I let the patient know that he needs to contact pulmonology for all his CPAP supplies. Noted.

## 2023-08-06 SDOH — ECONOMIC STABILITY: FOOD INSECURITY: WITHIN THE PAST 12 MONTHS, THE FOOD YOU BOUGHT JUST DIDN'T LAST AND YOU DIDN'T HAVE MONEY TO GET MORE.: NEVER TRUE

## 2023-08-06 SDOH — ECONOMIC STABILITY: TRANSPORTATION INSECURITY
IN THE PAST 12 MONTHS, HAS LACK OF TRANSPORTATION KEPT YOU FROM MEETINGS, WORK, OR FROM GETTING THINGS NEEDED FOR DAILY LIVING?: NO

## 2023-08-06 SDOH — ECONOMIC STABILITY: HOUSING INSECURITY
IN THE LAST 12 MONTHS, WAS THERE A TIME WHEN YOU DID NOT HAVE A STEADY PLACE TO SLEEP OR SLEPT IN A SHELTER (INCLUDING NOW)?: NO

## 2023-08-06 SDOH — ECONOMIC STABILITY: INCOME INSECURITY: HOW HARD IS IT FOR YOU TO PAY FOR THE VERY BASICS LIKE FOOD, HOUSING, MEDICAL CARE, AND HEATING?: NOT HARD AT ALL

## 2023-08-06 SDOH — ECONOMIC STABILITY: FOOD INSECURITY: WITHIN THE PAST 12 MONTHS, YOU WORRIED THAT YOUR FOOD WOULD RUN OUT BEFORE YOU GOT MONEY TO BUY MORE.: NEVER TRUE

## 2023-08-08 ENCOUNTER — OFFICE VISIT (OUTPATIENT)
Age: 58
End: 2023-08-08
Payer: OTHER GOVERNMENT

## 2023-08-08 VITALS
WEIGHT: 203.4 LBS | TEMPERATURE: 97.8 F | SYSTOLIC BLOOD PRESSURE: 158 MMHG | BODY MASS INDEX: 29.12 KG/M2 | DIASTOLIC BLOOD PRESSURE: 84 MMHG | OXYGEN SATURATION: 95 % | HEART RATE: 61 BPM | RESPIRATION RATE: 18 BRPM | HEIGHT: 70 IN

## 2023-08-08 DIAGNOSIS — G47.30 SLEEP APNEA, UNSPECIFIED TYPE: Primary | ICD-10-CM

## 2023-08-08 PROCEDURE — 99213 OFFICE O/P EST LOW 20 MIN: CPT

## 2023-08-08 SDOH — ECONOMIC STABILITY: HOUSING INSECURITY
IN THE LAST 12 MONTHS, WAS THERE A TIME WHEN YOU DID NOT HAVE A STEADY PLACE TO SLEEP OR SLEPT IN A SHELTER (INCLUDING NOW)?: PATIENT REFUSED

## 2023-08-08 SDOH — ECONOMIC STABILITY: INCOME INSECURITY: HOW HARD IS IT FOR YOU TO PAY FOR THE VERY BASICS LIKE FOOD, HOUSING, MEDICAL CARE, AND HEATING?: PATIENT DECLINED

## 2023-08-08 SDOH — ECONOMIC STABILITY: FOOD INSECURITY: WITHIN THE PAST 12 MONTHS, YOU WORRIED THAT YOUR FOOD WOULD RUN OUT BEFORE YOU GOT MONEY TO BUY MORE.: PATIENT DECLINED

## 2023-08-08 SDOH — ECONOMIC STABILITY: FOOD INSECURITY: WITHIN THE PAST 12 MONTHS, THE FOOD YOU BOUGHT JUST DIDN'T LAST AND YOU DIDN'T HAVE MONEY TO GET MORE.: PATIENT DECLINED

## 2023-08-08 ASSESSMENT — PATIENT HEALTH QUESTIONNAIRE - PHQ9
2. FEELING DOWN, DEPRESSED OR HOPELESS: 0
SUM OF ALL RESPONSES TO PHQ9 QUESTIONS 1 & 2: 0
SUM OF ALL RESPONSES TO PHQ QUESTIONS 1-9: 0
SUM OF ALL RESPONSES TO PHQ QUESTIONS 1-9: 0
1. LITTLE INTEREST OR PLEASURE IN DOING THINGS: 0
SUM OF ALL RESPONSES TO PHQ QUESTIONS 1-9: 0
SUM OF ALL RESPONSES TO PHQ QUESTIONS 1-9: 0

## 2023-11-30 ENCOUNTER — OFFICE VISIT (OUTPATIENT)
Age: 58
End: 2023-11-30
Payer: OTHER GOVERNMENT

## 2023-11-30 VITALS
BODY MASS INDEX: 29.35 KG/M2 | RESPIRATION RATE: 18 BRPM | HEART RATE: 70 BPM | HEIGHT: 70 IN | OXYGEN SATURATION: 97 % | WEIGHT: 205 LBS | DIASTOLIC BLOOD PRESSURE: 94 MMHG | SYSTOLIC BLOOD PRESSURE: 151 MMHG | TEMPERATURE: 98 F

## 2023-11-30 DIAGNOSIS — Z00.00 ENCOUNTER FOR GENERAL ADULT MEDICAL EXAMINATION WITHOUT ABNORMAL FINDINGS: ICD-10-CM

## 2023-11-30 DIAGNOSIS — R03.0 ELEVATED BLOOD PRESSURE READING: ICD-10-CM

## 2023-11-30 DIAGNOSIS — G47.30 SLEEP APNEA, UNSPECIFIED TYPE: ICD-10-CM

## 2023-11-30 DIAGNOSIS — Z23 ENCOUNTER FOR IMMUNIZATION: ICD-10-CM

## 2023-11-30 DIAGNOSIS — E66.3 OVERWEIGHT: Primary | ICD-10-CM

## 2023-11-30 DIAGNOSIS — E78.00 HYPERCHOLESTEREMIA: ICD-10-CM

## 2023-11-30 DIAGNOSIS — Z87.438 PERSONAL HISTORY OF OTHER DISEASES OF MALE GENITAL ORGANS: ICD-10-CM

## 2023-11-30 PROCEDURE — 99396 PREV VISIT EST AGE 40-64: CPT

## 2023-11-30 PROCEDURE — 90674 CCIIV4 VAC NO PRSV 0.5 ML IM: CPT

## 2023-11-30 RX ORDER — FAMOTIDINE 20 MG/1
TABLET, FILM COATED ORAL
COMMUNITY
Start: 2023-10-30

## 2023-11-30 RX ORDER — VIT C/B6/B5/MAGNESIUM/HERB 173 50-5-6-5MG
CAPSULE ORAL
COMMUNITY
Start: 2023-10-01

## 2023-11-30 RX ORDER — BIOTIN 5 MG
TABLET ORAL
COMMUNITY
Start: 2018-11-30

## 2023-11-30 RX ORDER — IBUPROFEN 800 MG
TABLET ORAL
COMMUNITY
Start: 2023-11-01

## 2023-11-30 RX ORDER — BACILLUS COAGULANS/INULIN 1B-250 MG
CAPSULE ORAL
COMMUNITY
Start: 2019-11-01

## 2023-11-30 RX ORDER — CALCIUM CARBONATE 260MG(650)
TABLET,CHEWABLE ORAL
COMMUNITY
Start: 2023-10-18

## 2023-11-30 RX ORDER — MULTIVIT-MIN/IRON/FOLIC ACID/K 18-600-40
CAPSULE ORAL
COMMUNITY
Start: 2019-11-30

## 2023-11-30 SDOH — ECONOMIC STABILITY: FOOD INSECURITY: WITHIN THE PAST 12 MONTHS, YOU WORRIED THAT YOUR FOOD WOULD RUN OUT BEFORE YOU GOT MONEY TO BUY MORE.: PATIENT DECLINED

## 2023-11-30 SDOH — ECONOMIC STABILITY: FOOD INSECURITY: WITHIN THE PAST 12 MONTHS, THE FOOD YOU BOUGHT JUST DIDN'T LAST AND YOU DIDN'T HAVE MONEY TO GET MORE.: PATIENT DECLINED

## 2023-11-30 SDOH — ECONOMIC STABILITY: INCOME INSECURITY: HOW HARD IS IT FOR YOU TO PAY FOR THE VERY BASICS LIKE FOOD, HOUSING, MEDICAL CARE, AND HEATING?: PATIENT DECLINED

## 2023-11-30 ASSESSMENT — PATIENT HEALTH QUESTIONNAIRE - PHQ9
SUM OF ALL RESPONSES TO PHQ QUESTIONS 1-9: 0
2. FEELING DOWN, DEPRESSED OR HOPELESS: 0
SUM OF ALL RESPONSES TO PHQ QUESTIONS 1-9: 0
SUM OF ALL RESPONSES TO PHQ9 QUESTIONS 1 & 2: 0
SUM OF ALL RESPONSES TO PHQ QUESTIONS 1-9: 0
1. LITTLE INTEREST OR PLEASURE IN DOING THINGS: 0
SUM OF ALL RESPONSES TO PHQ QUESTIONS 1-9: 0

## 2023-11-30 NOTE — PATIENT INSTRUCTIONS
Please check your blood pressure each morning and log it in Intio  Please schedule our office if you are persistently above 140/90

## 2023-12-01 LAB
ALBUMIN SERPL-MCNC: 4.2 G/DL (ref 3.5–5)
ALBUMIN/GLOB SERPL: 1.3 (ref 1.1–2.2)
ALP SERPL-CCNC: 89 U/L (ref 45–117)
ALT SERPL-CCNC: 42 U/L (ref 12–78)
ANION GAP SERPL CALC-SCNC: 4 MMOL/L (ref 5–15)
AST SERPL-CCNC: 17 U/L (ref 15–37)
BILIRUB SERPL-MCNC: 0.3 MG/DL (ref 0.2–1)
BUN SERPL-MCNC: 13 MG/DL (ref 6–20)
BUN/CREAT SERPL: 13 (ref 12–20)
CALCIUM SERPL-MCNC: 10.1 MG/DL (ref 8.5–10.1)
CHLORIDE SERPL-SCNC: 107 MMOL/L (ref 97–108)
CHOLEST SERPL-MCNC: 228 MG/DL
CO2 SERPL-SCNC: 29 MMOL/L (ref 21–32)
CREAT SERPL-MCNC: 1.01 MG/DL (ref 0.7–1.3)
EST. AVERAGE GLUCOSE BLD GHB EST-MCNC: 108 MG/DL
GLOBULIN SER CALC-MCNC: 3.3 G/DL (ref 2–4)
GLUCOSE SERPL-MCNC: 103 MG/DL (ref 65–100)
HBA1C MFR BLD: 5.4 % (ref 4–5.6)
HDLC SERPL-MCNC: 38 MG/DL
HDLC SERPL: 6 (ref 0–5)
LDLC SERPL CALC-MCNC: ABNORMAL MG/DL (ref 0–100)
LDLC SERPL DIRECT ASSAY-MCNC: 116 MG/DL (ref 0–100)
POTASSIUM SERPL-SCNC: 4.3 MMOL/L (ref 3.5–5.1)
PROT SERPL-MCNC: 7.5 G/DL (ref 6.4–8.2)
PSA SERPL-MCNC: 0.6 NG/ML (ref 0.01–4)
SODIUM SERPL-SCNC: 140 MMOL/L (ref 136–145)
TRIGL SERPL-MCNC: 586 MG/DL
VLDLC SERPL CALC-MCNC: ABNORMAL MG/DL

## 2024-01-04 ENCOUNTER — OFFICE VISIT (OUTPATIENT)
Age: 59
End: 2024-01-04
Payer: OTHER GOVERNMENT

## 2024-01-04 VITALS
RESPIRATION RATE: 16 BRPM | WEIGHT: 201 LBS | TEMPERATURE: 98.5 F | DIASTOLIC BLOOD PRESSURE: 70 MMHG | HEIGHT: 70 IN | OXYGEN SATURATION: 95 % | BODY MASS INDEX: 28.77 KG/M2 | SYSTOLIC BLOOD PRESSURE: 143 MMHG | HEART RATE: 66 BPM

## 2024-01-04 DIAGNOSIS — I10 PRIMARY HYPERTENSION: ICD-10-CM

## 2024-01-04 DIAGNOSIS — R03.0 ELEVATED BLOOD PRESSURE READING: Primary | ICD-10-CM

## 2024-01-04 PROCEDURE — 3077F SYST BP >= 140 MM HG: CPT

## 2024-01-04 PROCEDURE — 3078F DIAST BP <80 MM HG: CPT

## 2024-01-04 PROCEDURE — 99214 OFFICE O/P EST MOD 30 MIN: CPT

## 2024-01-04 RX ORDER — LOSARTAN POTASSIUM 25 MG/1
25 TABLET ORAL DAILY
Qty: 90 TABLET | Refills: 0 | Status: SHIPPED | OUTPATIENT
Start: 2024-01-04

## 2024-01-04 ASSESSMENT — PATIENT HEALTH QUESTIONNAIRE - PHQ9
SUM OF ALL RESPONSES TO PHQ QUESTIONS 1-9: 0
SUM OF ALL RESPONSES TO PHQ QUESTIONS 1-9: 0
2. FEELING DOWN, DEPRESSED OR HOPELESS: 0
SUM OF ALL RESPONSES TO PHQ9 QUESTIONS 1 & 2: 0
1. LITTLE INTEREST OR PLEASURE IN DOING THINGS: 0
SUM OF ALL RESPONSES TO PHQ QUESTIONS 1-9: 0
SUM OF ALL RESPONSES TO PHQ QUESTIONS 1-9: 0

## 2024-01-04 NOTE — PROGRESS NOTES
Patient has been identified by name and .    Chief Complaint   Patient presents with    Hypertension     Follow up on elevated BP       Vitals:    24 1434 24 1436   BP: (!) 143/71 (!) 143/70   Site: Right Upper Arm Right Upper Arm   Position: Sitting Sitting   Cuff Size: Medium Adult Medium Adult   Pulse: 66 66   Resp: 16    Temp: 98.5 °F (36.9 °C)    TempSrc: Oral    SpO2: 95%    Weight: 91.2 kg (201 lb)    Height: 1.778 m (5' 10\")         1. Have you been to the ER, urgent care clinic since your last visit?  Hospitalized since your last visit?No    2. Have you seen or consulted any other health care providers outside of the [facility] since your last visit?  Include any pap smears or colon screening. No

## 2024-01-04 NOTE — PROGRESS NOTES
[address] [place] [id number]   Office [phone], Fax [phone]   Subjective     Chief Complaint   Patient presents with    Hypertension     Follow up on elevated BP      Ismael Julio is a 58 y.o. male who presents for Htn follow up. Checking BP at home and they have been elevated. No specific symptoms or concerns.    Past Medical History  Past Medical History:   Diagnosis Date    BPH (benign prostatic hyperplasia)     Calculus of kidney     Diverticulosis     H/O seasonal allergies     Hiatal hernia     Obstructive sleep apnea     CPAP    Tremors of nervous system     benign essential tremors       Current Medications  Current Outpatient Medications   Medication Sig Dispense Refill    losartan (COZAAR) 25 MG tablet Take 1 tablet by mouth daily 90 tablet 0    Ascorbic Acid (VITAMIN C) 500 MG CAPS       Bacillus Coagulans-Inulin (PROBIOTIC) 1-250 BILLION-MG CAPS       Cholecalciferol (VITAMIN D3) 10 MCG (400 UNIT) CAPS       famotidine (PEPCID) 20 MG tablet       Glucosamine-Chondroit-Vit C-Mn (GLUCOSAMINE 1500 COMPLEX PO)       Krill Oil 1000 MG CAPS       Magnesium Citrate 100 MG TABS       turmeric 500 MG CAPS       Multiple Vitamin (MULTIVITAMIN PO) Take by mouth      Cetirizine HCl (ZYRTEC ALLERGY) 10 MG CAPS Take by mouth       No current facility-administered medications for this visit.       Objective   Vital Signs  BP (!) 143/70 (Site: Right Upper Arm, Position: Sitting, Cuff Size: Medium Adult)   Pulse 66   Temp 98.5 °F (36.9 °C) (Oral)   Resp 16   Ht 1.778 m (5' 10\")   Wt 91.2 kg (201 lb)   SpO2 95%   BMI 28.84 kg/m²     Physical Examination  Cardio: Regular rate/rhythm, no murmurs  Pulm: Clear b/l, no wheezing, no crackles, no ronchi    Assessment and Plan   Ismael Julio is a 58 y.o. who is here for HTN follow up. Start losartan daily. Discussed side effects and other therapy options. Obtain BMP in 2 weeks, to come in for lab visit. Continue lifestyle modificaiton.

## 2024-01-25 ENCOUNTER — NURSE ONLY (OUTPATIENT)
Age: 59
End: 2024-01-25

## 2024-01-25 DIAGNOSIS — I10 PRIMARY HYPERTENSION: ICD-10-CM

## 2024-01-25 DIAGNOSIS — R03.0 ELEVATED BLOOD PRESSURE READING: ICD-10-CM

## 2024-01-26 LAB
ANION GAP SERPL CALC-SCNC: 4 MMOL/L (ref 5–15)
BUN SERPL-MCNC: 17 MG/DL (ref 6–20)
BUN/CREAT SERPL: 17 (ref 12–20)
CALCIUM SERPL-MCNC: 10 MG/DL (ref 8.5–10.1)
CHLORIDE SERPL-SCNC: 107 MMOL/L (ref 97–108)
CO2 SERPL-SCNC: 28 MMOL/L (ref 21–32)
CREAT SERPL-MCNC: 1.03 MG/DL (ref 0.7–1.3)
GLUCOSE SERPL-MCNC: 107 MG/DL (ref 65–100)
POTASSIUM SERPL-SCNC: 4 MMOL/L (ref 3.5–5.1)
SODIUM SERPL-SCNC: 139 MMOL/L (ref 136–145)

## 2024-03-11 DIAGNOSIS — I10 PRIMARY HYPERTENSION: ICD-10-CM

## 2024-03-11 DIAGNOSIS — R03.0 ELEVATED BLOOD PRESSURE READING: ICD-10-CM

## 2024-03-12 NOTE — TELEPHONE ENCOUNTER
Medication Refill Request    Ismael ZOE Julio is requesting a refill of the following medication(s):   Requested Prescriptions     Pending Prescriptions Disp Refills    losartan (COZAAR) 25 MG tablet 90 tablet 0     Sig: Take 1 tablet by mouth daily        Listed PCP is Gabriel Pedroza DO   Last provider to prescribe medication: Kasia  Last Date of Medication Prescribed: 01/04/2024  Date of Last Office Visit at [facility]: 01/04/2024  FUTURE APPOINTMENT: No future appointments.    Please send refill to:    [id number] - [place], [place] - [address] [place] [address] - P [phone] - F [phone]  [address] [place] [address]  Phone: [phone] Fax: [phone]      Please review request and approve or deny with recommendations.

## 2024-03-14 RX ORDER — LOSARTAN POTASSIUM 25 MG/1
25 TABLET ORAL DAILY
Qty: 90 TABLET | Refills: 0 | Status: SHIPPED | OUTPATIENT
Start: 2024-03-14

## 2024-06-11 DIAGNOSIS — I10 PRIMARY HYPERTENSION: ICD-10-CM

## 2024-06-11 DIAGNOSIS — R03.0 ELEVATED BLOOD PRESSURE READING: ICD-10-CM

## 2024-06-17 RX ORDER — LOSARTAN POTASSIUM 25 MG/1
25 TABLET ORAL DAILY
Qty: 90 TABLET | Refills: 0 | Status: SHIPPED | OUTPATIENT
Start: 2024-06-17

## 2024-09-11 DIAGNOSIS — R03.0 ELEVATED BLOOD PRESSURE READING: ICD-10-CM

## 2024-09-11 DIAGNOSIS — I10 PRIMARY HYPERTENSION: ICD-10-CM

## 2024-09-12 RX ORDER — LOSARTAN POTASSIUM 25 MG/1
25 TABLET ORAL DAILY
Qty: 90 TABLET | Refills: 3 | Status: SHIPPED | OUTPATIENT
Start: 2024-09-12

## 2024-11-29 ENCOUNTER — OFFICE VISIT (OUTPATIENT)
Age: 59
End: 2024-11-29

## 2024-11-29 VITALS
RESPIRATION RATE: 18 BRPM | OXYGEN SATURATION: 95 % | WEIGHT: 200.2 LBS | TEMPERATURE: 98.7 F | HEIGHT: 70 IN | DIASTOLIC BLOOD PRESSURE: 81 MMHG | HEART RATE: 77 BPM | BODY MASS INDEX: 28.66 KG/M2 | SYSTOLIC BLOOD PRESSURE: 138 MMHG

## 2024-11-29 DIAGNOSIS — G47.30 SLEEP APNEA, UNSPECIFIED TYPE: ICD-10-CM

## 2024-11-29 DIAGNOSIS — Z00.00 ENCOUNTER FOR GENERAL ADULT MEDICAL EXAMINATION WITHOUT ABNORMAL FINDINGS: ICD-10-CM

## 2024-11-29 DIAGNOSIS — I10 PRIMARY HYPERTENSION: Primary | ICD-10-CM

## 2024-11-29 DIAGNOSIS — N52.9 ERECTILE DYSFUNCTION, UNSPECIFIED ERECTILE DYSFUNCTION TYPE: ICD-10-CM

## 2024-11-29 RX ORDER — TADALAFIL 10 MG/1
10 TABLET ORAL PRN
Qty: 15 TABLET | Refills: 0 | Status: SHIPPED | OUTPATIENT
Start: 2024-11-29

## 2024-11-29 ASSESSMENT — PATIENT HEALTH QUESTIONNAIRE - PHQ9
SUM OF ALL RESPONSES TO PHQ QUESTIONS 1-9: 0
SUM OF ALL RESPONSES TO PHQ QUESTIONS 1-9: 0
2. FEELING DOWN, DEPRESSED OR HOPELESS: NOT AT ALL
SUM OF ALL RESPONSES TO PHQ9 QUESTIONS 1 & 2: 0
1. LITTLE INTEREST OR PLEASURE IN DOING THINGS: NOT AT ALL
SUM OF ALL RESPONSES TO PHQ QUESTIONS 1-9: 0
SUM OF ALL RESPONSES TO PHQ QUESTIONS 1-9: 0

## 2024-11-29 NOTE — PROGRESS NOTES
[address] [place] [address]   Office [phone], Fax [phone]   Subjective     Chief Complaint   Patient presents with    Annual Exam    Hypertension    Other     Patient wanted the flu vaccine today.         Ismael Julio is a 59 y.o. male who presents for Htn follow up.  - Taking losartan 25mg daily  - Checking BP at home, generally in 120's/130's systolic  - Exercising frequently, shows me log documenting walks/elliptical workouts/etc.  - Wants flu vaccine  - Difficulty maintaining erection of recent    Past Medical History  Past Medical History:   Diagnosis Date    BPH (benign prostatic hyperplasia)     Calculus of kidney     Diverticulosis     H/O seasonal allergies     Hiatal hernia     Obstructive sleep apnea     CPAP    Tremors of nervous system     benign essential tremors     Current Medications  Current Outpatient Medications   Medication Sig Dispense Refill    tadalafil (CIALIS) 10 MG tablet Take 1 tablet by mouth as needed for Erectile Dysfunction 15 tablet 0    losartan (COZAAR) 25 MG tablet TAKE 1 TABLET BY MOUTH ONE TIME DAILY 90 tablet 3    Ascorbic Acid (VITAMIN C) 500 MG CAPS       Bacillus Coagulans-Inulin (PROBIOTIC) 1-250 BILLION-MG CAPS       famotidine (PEPCID) 20 MG tablet       Glucosamine-Chondroit-Vit C-Mn (GLUCOSAMINE 1500 COMPLEX PO)       Krill Oil 1000 MG CAPS       Magnesium Citrate 100 MG TABS       turmeric 500 MG CAPS       Multiple Vitamin (MULTIVITAMIN PO) Take by mouth      Cetirizine HCl (ZYRTEC ALLERGY) 10 MG CAPS Take by mouth      Cholecalciferol (VITAMIN D3) 10 MCG (400 UNIT) CAPS  (Patient not taking: Reported on 11/29/2024)       No current facility-administered medications for this visit.       Objective   Vital Signs  /81 (Site: Left Upper Arm, Position: Sitting, Cuff Size: Large Adult)   Pulse 77   Temp 98.7 °F (37.1 °C) (Temporal)   Resp 18   Ht 1.778 m (5' 10\")   Wt 90.8 kg (200 lb 3.2 oz)   SpO2 95%   BMI 28.73 kg/m²

## 2024-11-29 NOTE — PROGRESS NOTES
Chief Complaint   Patient presents with    Annual Exam    Hypertension     Vitals:    11/29/24 1559   BP: 138/81   Site: Left Upper Arm   Position: Sitting   Cuff Size: Large Adult   Pulse: 77   Resp: 18   Temp: 98.7 °F (37.1 °C)   TempSrc: Temporal   SpO2: 95%   Weight: 90.8 kg (200 lb 3.2 oz)   Height: 1.778 m (5' 10\")     \"Have you been to the ER, urgent care clinic since your last visit?  Hospitalized since your last visit?\"    NO    “Have you seen or consulted any other health care providers outside of [facility] since your last visit?”    NO            Click Here for Release of Records Request

## 2024-12-03 ENCOUNTER — TELEPHONE (OUTPATIENT)
Age: 59
End: 2024-12-03

## 2024-12-03 NOTE — TELEPHONE ENCOUNTER
Called the patient 2x. No . Sending a "[employer] message.    Authorization obtained for your visit on 12/05/2024 to see CONSUELO Bradford at [facility].

## 2025-02-04 ENCOUNTER — OFFICE VISIT (OUTPATIENT)
Age: 60
End: 2025-02-04

## 2025-02-04 VITALS
WEIGHT: 200 LBS | DIASTOLIC BLOOD PRESSURE: 67 MMHG | OXYGEN SATURATION: 98 % | TEMPERATURE: 97.8 F | HEART RATE: 58 BPM | SYSTOLIC BLOOD PRESSURE: 131 MMHG | BODY MASS INDEX: 28.7 KG/M2

## 2025-02-04 DIAGNOSIS — Z12.5 PROSTATE CANCER SCREENING: ICD-10-CM

## 2025-02-04 DIAGNOSIS — I10 PRIMARY HYPERTENSION: Primary | ICD-10-CM

## 2025-02-04 DIAGNOSIS — E66.3 OVERWEIGHT: ICD-10-CM

## 2025-02-04 LAB
ANION GAP SERPL CALC-SCNC: 7 MMOL/L (ref 2–12)
BUN SERPL-MCNC: 13 MG/DL (ref 6–20)
BUN/CREAT SERPL: 13 (ref 12–20)
CALCIUM SERPL-MCNC: 10.1 MG/DL (ref 8.5–10.1)
CHLORIDE SERPL-SCNC: 105 MMOL/L (ref 97–108)
CHOLEST SERPL-MCNC: 201 MG/DL
CO2 SERPL-SCNC: 28 MMOL/L (ref 21–32)
CREAT SERPL-MCNC: 1 MG/DL (ref 0.7–1.3)
ERYTHROCYTE [DISTWIDTH] IN BLOOD BY AUTOMATED COUNT: 12.4 % (ref 11.5–14.5)
EST. AVERAGE GLUCOSE BLD GHB EST-MCNC: 111 MG/DL
GLUCOSE SERPL-MCNC: 105 MG/DL (ref 65–100)
HBA1C MFR BLD: 5.5 % (ref 4–5.6)
HCT VFR BLD AUTO: 46.9 % (ref 36.6–50.3)
HDLC SERPL-MCNC: 53 MG/DL
HDLC SERPL: 3.8 (ref 0–5)
HGB BLD-MCNC: 15.6 G/DL (ref 12.1–17)
LDLC SERPL CALC-MCNC: 103 MG/DL (ref 0–100)
MCH RBC QN AUTO: 29.1 PG (ref 26–34)
MCHC RBC AUTO-ENTMCNC: 33.3 G/DL (ref 30–36.5)
MCV RBC AUTO: 87.5 FL (ref 80–99)
NRBC # BLD: 0 K/UL (ref 0–0.01)
NRBC BLD-RTO: 0 PER 100 WBC
PLATELET # BLD AUTO: 243 K/UL (ref 150–400)
PMV BLD AUTO: 10.1 FL (ref 8.9–12.9)
POTASSIUM SERPL-SCNC: 4.7 MMOL/L (ref 3.5–5.1)
PSA SERPL-MCNC: 0.6 NG/ML (ref 0.01–4)
RBC # BLD AUTO: 5.36 M/UL (ref 4.1–5.7)
SODIUM SERPL-SCNC: 140 MMOL/L (ref 136–145)
TRIGL SERPL-MCNC: 225 MG/DL
VLDLC SERPL CALC-MCNC: 45 MG/DL
WBC # BLD AUTO: 5.7 K/UL (ref 4.1–11.1)

## 2025-02-04 NOTE — PROGRESS NOTES
[address] [place] [address]   Office [phone], Fax [phone]   Subjective     Chief Complaint   Patient presents with    Hypertension      Ismael Julio is a 59 y.o. male who presents for the above.    - Just had another grandchild  - Not checking BP as much at home  - Continues to work on nutrition and exercise  - No new symptoms, feels well    Past Medical History  Past Medical History:   Diagnosis Date    BPH (benign prostatic hyperplasia)     Calculus of kidney     Diverticulosis     H/O seasonal allergies     Hiatal hernia     Obstructive sleep apnea     CPAP    Tremors of nervous system     benign essential tremors     Current Medications  Current Outpatient Medications   Medication Sig Dispense Refill    tadalafil (CIALIS) 10 MG tablet Take 1 tablet by mouth as needed for Erectile Dysfunction 15 tablet 0    losartan (COZAAR) 25 MG tablet TAKE 1 TABLET BY MOUTH ONE TIME DAILY 90 tablet 3    Ascorbic Acid (VITAMIN C) 500 MG CAPS       Bacillus Coagulans-Inulin (PROBIOTIC) 1-250 BILLION-MG CAPS       Cholecalciferol (VITAMIN D3) 10 MCG (400 UNIT) CAPS  (Patient not taking: Reported on 11/29/2024)      famotidine (PEPCID) 20 MG tablet       Glucosamine-Chondroit-Vit C-Mn (GLUCOSAMINE 1500 COMPLEX PO)       Krill Oil 1000 MG CAPS       Magnesium Citrate 100 MG TABS       turmeric 500 MG CAPS       Multiple Vitamin (MULTIVITAMIN PO) Take by mouth      Cetirizine HCl (ZYRTEC ALLERGY) 10 MG CAPS Take by mouth       No current facility-administered medications for this visit.     Objective   Vital Signs  /67   Pulse 58   Temp 97.8 °F (36.6 °C) (Oral)   Wt 90.7 kg (200 lb)   SpO2 98%   BMI 28.70 kg/m²     Physical Examination  Well appearing, NAD, Communicating well    Labs  Lab Results   Component Value Date/Time     01/25/2024 08:08 AM    K 4.0 01/25/2024 08:08 AM     01/25/2024 08:08 AM    CO2 28 01/25/2024 08:08 AM    BUN 17 01/25/2024 08:08 AM    GFRAA >60

## 2025-02-04 NOTE — PROGRESS NOTES
Identified pt with two pt identifiers(name and ). Reviewed record in preparation for visit and have obtained necessary documentation.  Chief Complaint   Patient presents with    Hypertension        Vitals:    25 0809   BP: 131/67   Pulse: 58   Temp: 97.8 °F (36.6 °C)   TempSrc: Oral   SpO2: 98%   Weight: 90.7 kg (200 lb)         Coordination of Care Questionnaire:  :     \"Have you been to the ER, urgent care clinic since your last visit?  Hospitalized since your last visit?\"    NO    “Have you seen or consulted any other health care providers outside of [facility] since your last visit?”    NO          Click Here for Release of Records Request

## 2025-02-21 ENCOUNTER — OFFICE VISIT (OUTPATIENT)
Age: 60
End: 2025-02-21

## 2025-02-21 VITALS
WEIGHT: 199 LBS | DIASTOLIC BLOOD PRESSURE: 65 MMHG | BODY MASS INDEX: 28.49 KG/M2 | RESPIRATION RATE: 17 BRPM | OXYGEN SATURATION: 97 % | HEIGHT: 70 IN | SYSTOLIC BLOOD PRESSURE: 119 MMHG | TEMPERATURE: 97.9 F | HEART RATE: 64 BPM

## 2025-02-21 DIAGNOSIS — R05.9 COUGH, UNSPECIFIED TYPE: Primary | ICD-10-CM

## 2025-02-21 DIAGNOSIS — R06.09 DYSPNEA ON EXERTION: ICD-10-CM

## 2025-02-21 DIAGNOSIS — J18.9 PNEUMONIA OF LEFT LOWER LOBE DUE TO INFECTIOUS ORGANISM: ICD-10-CM

## 2025-02-21 LAB
INFLUENZA A ANTIGEN, POC: NEGATIVE
INFLUENZA B ANTIGEN, POC: NEGATIVE
VALID INTERNAL CONTROL, POC: NORMAL

## 2025-02-21 RX ORDER — AZITHROMYCIN 250 MG/1
TABLET, FILM COATED ORAL
Qty: 6 TABLET | Refills: 0 | Status: SHIPPED | OUTPATIENT
Start: 2025-02-21 | End: 2025-03-03

## 2025-02-21 RX ORDER — ACETAMINOPHEN 500 MG
500 TABLET ORAL EVERY 6 HOURS PRN
COMMUNITY

## 2025-02-21 RX ORDER — AMOXICILLIN 500 MG/1
1000 CAPSULE ORAL 3 TIMES DAILY
Qty: 30 CAPSULE | Refills: 0 | Status: SHIPPED | OUTPATIENT
Start: 2025-02-21 | End: 2025-02-26

## 2025-02-21 RX ORDER — DEXTROMETHORPHAN POLISTIREX 30 MG/5ML
60 SUSPENSION ORAL 2 TIMES DAILY PRN
COMMUNITY

## 2025-02-21 ASSESSMENT — PATIENT HEALTH QUESTIONNAIRE - PHQ9
SUM OF ALL RESPONSES TO PHQ QUESTIONS 1-9: 0
1. LITTLE INTEREST OR PLEASURE IN DOING THINGS: NOT AT ALL
2. FEELING DOWN, DEPRESSED OR HOPELESS: NOT AT ALL
SUM OF ALL RESPONSES TO PHQ QUESTIONS 1-9: 0
SUM OF ALL RESPONSES TO PHQ QUESTIONS 1-9: 0
SUM OF ALL RESPONSES TO PHQ9 QUESTIONS 1 & 2: 0
SUM OF ALL RESPONSES TO PHQ QUESTIONS 1-9: 0

## 2025-02-21 NOTE — PROGRESS NOTES
[facility]  [address] [place] [address]   Office [phone], Fax [phone]      Chief Complaint:     Chief Complaint   Patient presents with    Fever     Times one week    Cough       Subjective:   HPI:  Ismael Julio is a 59 y.o. male that presents to clinic for:    Pt reports approximately one week of fever chills. This began after exposure to grand-daughter with similar symptoms.   A few days later patient started with cough that started as dry and later became productive or green/brown sputum.   This morning patient reported minimal blood streaking in mucous.   This resolved later in the afternoon.   Pt also reports fatigue, decreased PO intake, and KOWALSKI.   [place].   Denies any chest pain, rhinorrhea, sore throat, nasal congestion, or lower extremity swelling.   He has been using Delsym and Primatine mist with some relief.       ROS:   Negative other than mentioned in HPI      Please note that this dictation was completed with ERUCES, the computer voice recognition software.  Quite often unanticipated grammatical, syntax, homophones, and other interpretive errors are inadvertently transcribed by the computer software.  Please disregard these errors.  Please excuse any errors that have escaped final proofreading.     Past medical history, social history, medications, and allergies personally reviewed.  Past Medical History:   Diagnosis Date    BPH (benign prostatic hyperplasia)     Calculus of kidney     Diverticulosis     H/O seasonal allergies     Hiatal hernia     Obstructive sleep apnea     CPAP    Tremors of nervous system     benign essential tremors     Social History     Socioeconomic History    Marital status:      Spouse name: Not on file    Number of children: Not on file    Years of education: Not on file    Highest education level: Not on file   Occupational History    Not on file   Tobacco Use    Smoking

## 2025-02-21 NOTE — PROGRESS NOTES
Roomed by name and .    Chief Complaint   Patient presents with    Fever     Times one week    Cough        Vitals:    25 1307   BP: 119/65   Pulse: 64   Resp: 17   Temp: 97.9 °F (36.6 °C)   TempSrc: Temporal   SpO2: 97%   Weight: 90.3 kg (199 lb)   Height: 1.778 m (5' 10\")          \"Have you been to the ER, urgent care clinic since your last visit?  Hospitalized since your last visit?\"    NO    “Have you seen or consulted any other health care providers outside of [facility] since your last visit?”    NO          Click Here for Release of Records Request

## 2025-03-28 ENCOUNTER — ANCILLARY PROCEDURE (OUTPATIENT)
Age: 60
End: 2025-03-28
Payer: OTHER GOVERNMENT

## 2025-03-28 ENCOUNTER — OFFICE VISIT (OUTPATIENT)
Age: 60
End: 2025-03-28
Payer: OTHER GOVERNMENT

## 2025-03-28 VITALS
BODY MASS INDEX: 28.63 KG/M2 | HEART RATE: 65 BPM | SYSTOLIC BLOOD PRESSURE: 125 MMHG | HEIGHT: 70 IN | WEIGHT: 200 LBS | RESPIRATION RATE: 18 BRPM | TEMPERATURE: 98 F | DIASTOLIC BLOOD PRESSURE: 63 MMHG | OXYGEN SATURATION: 97 %

## 2025-03-28 DIAGNOSIS — R07.9 CHEST PAIN, UNSPECIFIED TYPE: Primary | ICD-10-CM

## 2025-03-28 DIAGNOSIS — J18.9 PNEUMONIA OF LEFT LOWER LOBE DUE TO INFECTIOUS ORGANISM: ICD-10-CM

## 2025-03-28 DIAGNOSIS — R06.09 DYSPNEA ON EXERTION: ICD-10-CM

## 2025-03-28 PROCEDURE — 71046 X-RAY EXAM CHEST 2 VIEWS: CPT

## 2025-03-28 PROCEDURE — 99214 OFFICE O/P EST MOD 30 MIN: CPT

## 2025-03-28 RX ORDER — ALBUTEROL SULFATE 90 UG/1
2 INHALANT RESPIRATORY (INHALATION) EVERY 4 HOURS PRN
Qty: 18 G | Refills: 1 | Status: SHIPPED | OUTPATIENT
Start: 2025-03-28

## 2025-03-28 RX ORDER — DIPHENHYDRAMINE HCL 25 MG
25 CAPSULE ORAL EVERY 6 HOURS PRN
COMMUNITY

## 2025-03-28 SDOH — ECONOMIC STABILITY: FOOD INSECURITY: WITHIN THE PAST 12 MONTHS, THE FOOD YOU BOUGHT JUST DIDN'T LAST AND YOU DIDN'T HAVE MONEY TO GET MORE.: NEVER TRUE

## 2025-03-28 SDOH — ECONOMIC STABILITY: INCOME INSECURITY: IN THE LAST 12 MONTHS, WAS THERE A TIME WHEN YOU WERE NOT ABLE TO PAY THE MORTGAGE OR RENT ON TIME?: NO

## 2025-03-28 SDOH — ECONOMIC STABILITY: FOOD INSECURITY: WITHIN THE PAST 12 MONTHS, YOU WORRIED THAT YOUR FOOD WOULD RUN OUT BEFORE YOU GOT MONEY TO BUY MORE.: NEVER TRUE

## 2025-03-28 SDOH — ECONOMIC STABILITY: TRANSPORTATION INSECURITY
IN THE PAST 12 MONTHS, HAS THE LACK OF TRANSPORTATION KEPT YOU FROM MEDICAL APPOINTMENTS OR FROM GETTING MEDICATIONS?: NO

## 2025-03-28 ASSESSMENT — PATIENT HEALTH QUESTIONNAIRE - PHQ9
SUM OF ALL RESPONSES TO PHQ QUESTIONS 1-9: 0
1. LITTLE INTEREST OR PLEASURE IN DOING THINGS: NOT AT ALL
SUM OF ALL RESPONSES TO PHQ QUESTIONS 1-9: 0
SUM OF ALL RESPONSES TO PHQ QUESTIONS 1-9: 0
2. FEELING DOWN, DEPRESSED OR HOPELESS: NOT AT ALL
SUM OF ALL RESPONSES TO PHQ QUESTIONS 1-9: 0

## 2025-03-28 NOTE — PROGRESS NOTES
Roomed by name and .    Chief Complaint   Patient presents with    Pneumonia        Vitals:    25 1404   BP: 125/63   Pulse: 65   Resp: 18   Temp: 98 °F (36.7 °C)   TempSrc: Temporal   SpO2: 97%   Weight: 90.7 kg (200 lb)   Height: 1.778 m (5' 10\")          \"Have you been to the ER, urgent care clinic since your last visit?  Hospitalized since your last visit?\"    NO    “Have you seen or consulted any other health care providers outside of [facility] since your last visit?”    NO          Click Here for Release of Records Request

## 2025-03-28 NOTE — PROGRESS NOTES
[facility]  [address] [place] [address]   Office [phone], Fax [phone]      Chief Complaint:     Chief Complaint   Patient presents with    Pneumonia       Subjective:   HPI:  Ismael Julio is a 59 y.o. male that presents to clinic for:    Follow up of PNA:   At last visit pt diagnosed with L lower lobe PNA, given 5 day course of Amoxicillin and Azithromycin.   -Pt reports symptoms of fever, chills, fatigue, and cough improved with use of anibiotics.   Cough did not completely resolve but improved. However over past week cough frequency has increased.   Described as dry cough worse in the morning.   Improved with use of Benadryl, Zyrtec, and Flonase.   Pt has been taking Mucinex with minimal relief.   Pt also reports KOWALSKI and chest tightness with exertion. Specifically with stairs. Denies history of similar. No previous episodes of chest pain. Chest pain does not radiate. Completely resolves with rest. Worse after episodes of coughing.   Denies any other concerns at this time.      ROS:   Negative other than mentioned in HPI      Please note that this dictation was completed with [employer], the [employer] voice recognition software.  Quite often unanticipated grammatical, syntax, homophones, and other interpretive errors are inadvertently transcribed by the computer software.  Please disregard these errors.  Please excuse any errors that have escaped final proofreading.     Past medical history, social history, medications, and allergies personally reviewed.  Past Medical History:   Diagnosis Date    BPH (benign prostatic hyperplasia)     Calculus of kidney     Diverticulosis     H/O seasonal allergies     Hiatal hernia     Obstructive sleep apnea     CPAP    Tremors of nervous system     benign essential tremors     Social History     Socioeconomic History    Marital status:      Spouse name: Not on file    Number of children: Not on file

## 2025-03-29 ENCOUNTER — RESULTS FOLLOW-UP (OUTPATIENT)
Age: 60
End: 2025-03-29

## 2025-04-04 ENCOUNTER — TELEPHONE (OUTPATIENT)
Age: 60
End: 2025-04-04

## 2025-04-04 NOTE — TELEPHONE ENCOUNTER
Hi Dr. Sue,    Patient stated he briefly spoke to you about getting a referral for mental health at his last visit. He is requesting a referral gets placed since he has an upcoming appointment on Monday April 14th at [facility] [place]- Address: [address] [place] [address]  [address], [place] [address]  Phone: [phone]    Once placed please let Mrs. Ferguson know so she can work the referral. Thank you.

## 2025-04-09 ENCOUNTER — PATIENT MESSAGE (OUTPATIENT)
Age: 60
End: 2025-04-09

## 2025-04-09 ENCOUNTER — OFFICE VISIT (OUTPATIENT)
Age: 60
End: 2025-04-09
Payer: OTHER GOVERNMENT

## 2025-04-09 VITALS
HEART RATE: 77 BPM | SYSTOLIC BLOOD PRESSURE: 136 MMHG | DIASTOLIC BLOOD PRESSURE: 71 MMHG | RESPIRATION RATE: 16 BRPM | TEMPERATURE: 97.7 F | OXYGEN SATURATION: 97 %

## 2025-04-09 DIAGNOSIS — R05.2 SUBACUTE COUGH: ICD-10-CM

## 2025-04-09 DIAGNOSIS — F48.9 MENTAL HEALTH PROBLEM: Primary | ICD-10-CM

## 2025-04-09 DIAGNOSIS — J40 BRONCHITIS: Primary | ICD-10-CM

## 2025-04-09 PROCEDURE — 99213 OFFICE O/P EST LOW 20 MIN: CPT

## 2025-04-09 RX ORDER — PREDNISONE 10 MG/1
TABLET ORAL
Qty: 18 TABLET | Refills: 0 | Status: SHIPPED | OUTPATIENT
Start: 2025-04-09 | End: 2025-04-16

## 2025-04-09 RX ORDER — AZELASTINE 1 MG/ML
1 SPRAY, METERED NASAL 2 TIMES DAILY
Qty: 60 ML | Refills: 1 | Status: SHIPPED | OUTPATIENT
Start: 2025-04-09

## 2025-04-09 NOTE — PROGRESS NOTES
History of Present Illness:    Ismael Julio is a 59 y.o. male who presents for lingering cough     He was diagnosed with pneumonia a couple of months ago and seems to have developed a cough following that.     Cough has increased in frequency and severity   He has multiple bothersome coughing fits throughout the day   Albuterol has not helped       Physical Examination:     /71 (BP Site: Left Upper Arm, Patient Position: Sitting, BP Cuff Size: Large Adult)   Pulse 77   Temp 97.7 °F (36.5 °C) (Temporal)   Resp 16   SpO2 97%     GEN: No apparent distress. Alert and oriented and responds to all questions appropriately.  EYES:  Conjunctiva clear; extraocular movements are intact  EAR: External ears are normal.       LUNGS: scattered wheezing throughout with decreased air movement   CARDIOVASCULAR: Normal rate, regular rhythm   NEUROLOGIC:  No focal neurologic deficits. Coordination and gait grossly intact.   EXT: Well perfused. No edema.  SKIN: No obvious rashes.        Past Medical History:   Diagnosis Date    BPH (benign prostatic hyperplasia)     Calculus of kidney     Diverticulosis     H/O seasonal allergies     Hiatal hernia     Obstructive sleep apnea     CPAP    Tremors of nervous system     benign essential tremors       Current Outpatient Medications   Medication Sig Dispense Refill    predniSONE (DELTASONE) 10 MG tablet Take 4 tablets by mouth daily for 3 days, THEN 2 tablets daily for 2 days, THEN 1 tablet daily for 2 days. 18 tablet 0    azelastine (ASTELIN) 0.1 % nasal spray 1 spray by Nasal route 2 times daily Use in each nostril as directed 60 mL 1    diphenhydrAMINE (BENADRYL) 25 MG capsule Take 1 capsule by mouth every 6 hours as needed for Itching      albuterol sulfate HFA (VENTOLIN HFA) 108 (90 Base) MCG/ACT inhaler Inhale 2 puffs into the lungs every 4 hours as needed for Wheezing (To be taken 30 minutes prior to activity) 18 g 1    dextromethorphan (DELSYM) 30 MG/5ML extended

## 2025-04-09 NOTE — PROGRESS NOTES
Chief Complaint   Patient presents with    Cough       Patient identified with name and .     Vitals:    25 1658   BP: 136/71   BP Site: Left Upper Arm   Patient Position: Sitting   BP Cuff Size: Large Adult   Pulse: 77   Resp: 16   Temp: 97.7 °F (36.5 °C)   TempSrc: Temporal   SpO2: 97%        1. Have you been to the ER, urgent care clinic since your last visit?  Hospitalized since your last visit?No    2. Have you seen or consulted any other health care providers outside of the [facility] since your last visit?  Include any pap smears or colon screening. No    Health Maintenance reviewed.

## 2025-04-18 ENCOUNTER — PATIENT MESSAGE (OUTPATIENT)
Age: 60
End: 2025-04-18

## 2025-04-18 NOTE — TELEPHONE ENCOUNTER
Medication Refill Request    Ismael ENRIQUEZ [facility] is requesting a refill of the following medication(s):   Requested Prescriptions     Pending Prescriptions Disp Refills    albuterol sulfate HFA (VENTOLIN HFA) 108 (90 Base) MCG/ACT inhaler 18 g 1     Sig: Inhale 2 puffs into the lungs every 4 hours as needed for Wheezing (To be taken 30 minutes prior to activity)        Listed PCP is Gabrile Pedroza,    Last provider to prescribe medication: Dr Sue  Last Date of Medication Prescribed:  03/08/2025  Date of Last Office Visit at [facility]:  04/09/2025  FUTURE APPOINTMENT:   Future Appointments   Date Time Provider Department Center   6/9/2025  9:00 AM Cecil Muñoz MD [facility]       Please send refill to:    [id number] [place], [place] - [address] [facility] [phone] - F [phone]  [id number] [place] [address]  Phone: [phone] Fax: [phone]      Please review request and approve or deny with recommendations.

## 2025-04-19 RX ORDER — ALBUTEROL SULFATE 90 UG/1
2 INHALANT RESPIRATORY (INHALATION) EVERY 4 HOURS PRN
Qty: 18 G | Refills: 1 | Status: SHIPPED | OUTPATIENT
Start: 2025-04-19

## 2025-04-21 ENCOUNTER — OFFICE VISIT (OUTPATIENT)
Age: 60
End: 2025-04-21
Payer: OTHER GOVERNMENT

## 2025-04-21 VITALS
SYSTOLIC BLOOD PRESSURE: 155 MMHG | BODY MASS INDEX: 28.85 KG/M2 | DIASTOLIC BLOOD PRESSURE: 81 MMHG | WEIGHT: 201.5 LBS | TEMPERATURE: 98.4 F | RESPIRATION RATE: 18 BRPM | HEIGHT: 70 IN | OXYGEN SATURATION: 94 % | HEART RATE: 71 BPM

## 2025-04-21 DIAGNOSIS — R05.2 SUBACUTE COUGH: Primary | ICD-10-CM

## 2025-04-21 PROCEDURE — 99213 OFFICE O/P EST LOW 20 MIN: CPT

## 2025-04-21 RX ORDER — CODEINE PHOSPHATE AND GUAIFENESIN 10; 100 MG/5ML; MG/5ML
5 SOLUTION ORAL 3 TIMES DAILY PRN
Qty: 70 ML | Refills: 0 | Status: SHIPPED | OUTPATIENT
Start: 2025-04-21 | End: 2025-04-28

## 2025-04-21 ASSESSMENT — PATIENT HEALTH QUESTIONNAIRE - PHQ9
SUM OF ALL RESPONSES TO PHQ QUESTIONS 1-9: 0
1. LITTLE INTEREST OR PLEASURE IN DOING THINGS: NOT AT ALL
2. FEELING DOWN, DEPRESSED OR HOPELESS: NOT AT ALL
SUM OF ALL RESPONSES TO PHQ QUESTIONS 1-9: 0

## 2025-04-21 NOTE — PROGRESS NOTES
[facility] Medicine Residency         4/21/2025   Chief Complaint   Patient presents with    Follow-up     Cough       HPI:  Ismael Julio is a 59 y.o. male who presents to clinic today for dry cough.  They were last seen 04/09    Subacute cough with wheezing  Patient reports feeling overall better since getting treated for pneumonia but continues to have dry nonproductive cough with wheezing. Last appt, prescription of 5 day steroid course helped so was able to get some sleep. Patient is not sleeping through out the night due to symptoms. Patient is using albuterol but is getting only 1.5-2hr of relief. Patient's children has asthma. Patient underwent pulmonary function tests 6 months ago; results were normal. Patient had CT chest few years ago; [place]       Patients past medical, surgical and family histories were reviewed.    Allergies and Medications reviewed and updated.    Review of Systems    Pertinent per HPI    Objective:  Vitals:    04/21/25 1534 04/21/25 1547   BP: (!) 144/65 (!) 155/81   BP Site: Right Upper Arm Left Upper Arm   Patient Position: Sitting Sitting   BP Cuff Size: Medium Adult Medium Adult   Pulse: 71    Resp: 18    Temp: 98.4 °F (36.9 °C)    TempSrc: Oral    SpO2: 94%    Weight: 91.4 kg (201 lb 8 oz)    Height: 1.778 m (5' 10\")      Body mass index is 28.91 kg/m².    Physical Exam  General: Patient alert and oriented and in NAD  HEENT: PER/EOMI, no conjunctival pallor or scleral icterus.   Heart: Regular rate and rhythm, No murmurs, rubs or gallops.  2+ peripheral pulses  Lungs: Bilateral inspiratory & expiratory wheezing, no rales or rhonchi  Ext: No edema  Skin: No rashes or lesions noted on exposed skin  Psych: Appropriate mood and affect    No results found for this or any previous visit (from the past 24 hours).    Assessment and Plan:  1. Subacute cough: subacute, uncontrolled. Vitals notable for HTN. PE notable for

## 2025-04-21 NOTE — PROGRESS NOTES
Identified pt with two pt identifiers(name and ). Reviewed record in preparation for visit and have obtained necessary documentation.  Chief Complaint   Patient presents with    Follow-up        Health Maintenance Due   Topic    HIV screen     Hepatitis B vaccine (1 of 3 - 19+ 3-dose series)    Pneumococcal 50+ years Vaccine (1 of 1 - PCV)    COVID-19 Vaccine ( season)       Vitals:    25 1534   Height: 1.778 m (5' 10\")         \"Have you been to the ER, urgent care clinic since your last visit?  Hospitalized since your last visit?\"    NO    “Have you seen or consulted any other health care providers outside of [facility] since your last visit?”    NO          Click Here for Release of Records Request     This patient is accompanied in the office by his self.  I have received verbal consent from Ismael Julio to discuss any/all medical information while they are present in the room.

## 2025-04-25 ENCOUNTER — PATIENT MESSAGE (OUTPATIENT)
Age: 60
End: 2025-04-25

## 2025-04-25 DIAGNOSIS — R06.2 WHEEZING: ICD-10-CM

## 2025-04-25 DIAGNOSIS — R05.2 SUBACUTE COUGH: ICD-10-CM

## 2025-04-25 DIAGNOSIS — J40 BRONCHITIS: Primary | ICD-10-CM

## 2025-04-26 RX ORDER — FLUTICASONE PROPIONATE 44 UG/1
2 AEROSOL, METERED RESPIRATORY (INHALATION) 2 TIMES DAILY
Qty: 21.2 G | Refills: 0 | Status: SHIPPED | OUTPATIENT
Start: 2025-04-26

## 2025-04-26 NOTE — TELEPHONE ENCOUNTER
Received message regarding patient continued cough and wheeze despite albuterol. Reviewed chart regarding recent visits with my colleagues. I will send in flovent inhaler for patient to attempt to get symptomatic improvement as seems to have a component of obstructive disease based on my colleagues markel. He should return to clinic within the next 2 weeks for re-evaluation as he is anticipating establishing with pulmonology. I will inform patient of this and ER precautions.    Gabriel Pedroza, DO

## 2025-05-01 ENCOUNTER — HOSPITAL ENCOUNTER (OUTPATIENT)
Facility: HOSPITAL | Age: 60
Discharge: HOME | End: 2025-05-01
Payer: OTHER GOVERNMENT

## 2025-05-01 VITALS — RESPIRATION RATE: 18 BRPM | HEART RATE: 75 BPM | OXYGEN SATURATION: 95 %

## 2025-05-01 DIAGNOSIS — R05.2 SUBACUTE COUGH: ICD-10-CM

## 2025-05-01 PROCEDURE — 94729 DIFFUSING CAPACITY: CPT

## 2025-05-01 PROCEDURE — 6370000000 HC RX 637 (ALT 250 FOR IP)

## 2025-05-01 PROCEDURE — 94640 AIRWAY INHALATION TREATMENT: CPT

## 2025-05-01 PROCEDURE — 94060 EVALUATION OF WHEEZING: CPT

## 2025-05-01 PROCEDURE — 94726 PLETHYSMOGRAPHY LUNG VOLUMES: CPT

## 2025-05-01 RX ORDER — ALBUTEROL SULFATE 90 UG/1
2 INHALANT RESPIRATORY (INHALATION) ONCE
Status: COMPLETED | OUTPATIENT
Start: 2025-05-01 | End: 2025-05-01

## 2025-05-01 RX ADMIN — ALBUTEROL SULFATE 2 PUFF: 108 INHALANT RESPIRATORY (INHALATION) at 08:22

## 2025-05-02 NOTE — PROCEDURES
Formerly named [facility]          [address] [place]  [address]                         PULMONARY FUNCTION TEST      PATIENT NAME: AMBER JUAREZ             : 1965  MED REC NO: [id number]                       ROOM:   ACCOUNT NO: [id number]                       ADMIT DATE: 2025  PROVIDER: Rafita Figueroa MD    DATE OF SERVICE:  2025    Pulmonary function test and ABG reviewed for the patient. A pulmonary function tests showed an FEV1 to FVC ratio of 62% predicted.  This is consistent with an obstructive defect.  FEV1 is moderately reduced at 2.45 L and 67% predicted, making this is moderate obstructive defect.  FVC is within normal limits at 3.94 L, and 84% predicted.  Both FEV1 and FVC show significant improvement with administration of short-acting bronchodilators.  Lung volumes are normal without evidence of restriction, air trapping or hyperinflation.  Total lung capacity is 85% predicted and residual volume is 90% predicted.  There is increased airway resistance and DLCO is within normal limits.  ABG is normal without concerning changes.        RAFITA FIGUEROA MD      KMP/AQS  D:  2025 08:51:34  T:  2025 09:16:04  JOB #:  [id number]

## 2025-06-09 ENCOUNTER — OFFICE VISIT (OUTPATIENT)
Age: 60
End: 2025-06-09
Payer: OTHER GOVERNMENT

## 2025-06-09 VITALS
HEIGHT: 70 IN | SYSTOLIC BLOOD PRESSURE: 138 MMHG | OXYGEN SATURATION: 98 % | BODY MASS INDEX: 28.6 KG/M2 | DIASTOLIC BLOOD PRESSURE: 70 MMHG | HEART RATE: 76 BPM | WEIGHT: 199.8 LBS

## 2025-06-09 DIAGNOSIS — R06.02 SHORTNESS OF BREATH: ICD-10-CM

## 2025-06-09 DIAGNOSIS — R07.9 CHEST PAIN, UNSPECIFIED TYPE: Primary | ICD-10-CM

## 2025-06-09 DIAGNOSIS — E78.5 DYSLIPIDEMIA: ICD-10-CM

## 2025-06-09 PROCEDURE — 93010 ELECTROCARDIOGRAM REPORT: CPT | Performed by: SPECIALIST

## 2025-06-09 PROCEDURE — 99204 OFFICE O/P NEW MOD 45 MIN: CPT | Performed by: SPECIALIST

## 2025-06-09 PROCEDURE — 93005 ELECTROCARDIOGRAM TRACING: CPT | Performed by: SPECIALIST

## 2025-06-09 RX ORDER — IBUPROFEN 200 MG
200 TABLET ORAL EVERY 6 HOURS PRN
COMMUNITY

## 2025-06-09 RX ORDER — ROSUVASTATIN CALCIUM 10 MG/1
10 TABLET, COATED ORAL NIGHTLY
Qty: 90 TABLET | Refills: 3 | Status: SHIPPED | OUTPATIENT
Start: 2025-06-09

## 2025-06-09 NOTE — PROGRESS NOTES
Orders for Echo and treadmill stress test -- for CP or SOB   CT heart scan     See me in 1 year  per Dr. Muñoz's VO.

## 2025-06-09 NOTE — PATIENT INSTRUCTIONS
Patient Education        Learning About the Mediterranean Diet  What is the Mediterranean diet?     The Mediterranean diet is a style of eating rather than a diet plan. It features foods eaten in [place], [place], [place], and other countries along the [place] Sea. It emphasizes eating foods like fish, fruits, vegetables, beans, high-fiber breads and whole grains, nuts, and olive oil. This style of eating includes limited red meat, cheese, and sweets.  Why choose the Mediterranean diet?  A Mediterranean-style diet may improve heart health. It contains more fat than other heart-healthy diets. But the fats are mainly from nuts, unsaturated oils (such as fish oils and olive oil), and certain nut or seed oils (such as canola, soybean, or flaxseed oil). These fats may help protect the heart and blood vessels.  How can you get started on the Mediterranean diet?  Here are some things you can do to switch to a more Mediterranean way of eating.  What to eat  Eat a variety of fruits and vegetables each day, such as grapes, blueberries, tomatoes, broccoli, peppers, figs, olives, spinach, eggplant, beans, lentils, and chickpeas.  Eat a variety of whole-grain foods each day, such as oats, brown rice, and whole wheat bread, pasta, and couscous.  Eat fish at least 2 times a week. Try tuna, salmon, mackerel, lake trout, herring, or sardines.  Eat moderate amounts of low-fat dairy products, such as milk, cheese, or yogurt.  Eat moderate amounts of poultry and eggs.  Choose healthy (unsaturated) fats, such as nuts, olive oil, and certain nut or seed oils like canola, soybean, and flaxseed.  Limit unhealthy (saturated) fats, such as butter, palm oil, and coconut oil. And limit fats found in animal products, such as meat and dairy products made with whole milk. Try to eat red meat only a few times a month in very small amounts.  Limit sweets and desserts to only a few times a week. This includes sugar-sweetened

## 2025-06-09 NOTE — PROGRESS NOTES
Chief Complaint   Patient presents with    Chest Pain    Shortness of Breath     There were no vitals filed for this visit.    Chest pain Yes, during DX of PNA    ER, urgent care, or hospitalized outside of [facility] since your last visit?  NO     Refills NO

## 2025-06-09 NOTE — PROGRESS NOTES
Cecil Muñoz MD. [facility]          Patient: Ismael Julio  : 1965      Today's Date: 2025        HISTORY OF PRESENT ILLNESS:     History of Present Illness:  Referred for KOWALSKI and CP - also abnormal EKG.   EKG 3/28/25 - NSR, septal Q's    He had coughing and SOB a couple of months ago after a PNA - better now with inhaled steroids.    Has some class 2 KOWALSKI still. No sig CP - slight aching randomly.        PAST MEDICAL HISTORY:     Past Medical History:   Diagnosis Date    BPH (benign prostatic hyperplasia)     Calculus of kidney     Diverticulosis     H/O seasonal allergies     Hiatal hernia     Obstructive sleep apnea     CPAP    Tremors of nervous system     benign essential tremors       Past Surgical History:   Procedure Laterality Date    APPENDECTOMY      COLONOSCOPY N/A 3/9/2022    COLONOSCOPY performed by Tristan Madrigal MD at [facility] ENDOSCOPY    COLONOSCOPY N/A 2017    COLONOSCOPY performed by Tristan Madrigal MD at [facility] ENDOSCOPY    HERNIA REPAIR       history of vasectomy    TRABECULOPLASTY BY LASER SURGERY      LASIC             CURRENT MEDICATIONS:    .  Current Outpatient Medications   Medication Sig Dispense Refill    ibuprofen (ADVIL;MOTRIN) 200 MG tablet Take 1 tablet by mouth every 6 hours as needed for Pain      fluticasone (FLOVENT HFA) 44 MCG/ACT inhaler Inhale 2 puffs into the lungs 2 times daily 21.2 g 0    albuterol sulfate HFA (VENTOLIN HFA) 108 (90 Base) MCG/ACT inhaler Inhale 2 puffs into the lungs every 4 hours as needed for Wheezing (To be taken 30 minutes prior to activity) 18 g 1    diphenhydrAMINE (BENADRYL) 25 MG capsule Take 1 capsule by mouth every 6 hours as needed for Itching      acetaminophen (TYLENOL) 500 MG tablet Take 1 tablet by mouth every 6 hours as needed for Pain      losartan (COZAAR) 25 MG tablet TAKE 1 TABLET BY MOUTH ONE TIME DAILY 90 tablet 3    Ascorbic Acid (VITAMIN C) 500 MG CAPS       Bacillus Coagulans-Inulin (PROBIOTIC) 1-250 BILLION-MG CAPS

## 2025-06-24 ENCOUNTER — RESULTS FOLLOW-UP (OUTPATIENT)
Age: 60
End: 2025-06-24

## 2025-06-24 ENCOUNTER — HOSPITAL ENCOUNTER (OUTPATIENT)
Facility: HOSPITAL | Age: 60
Discharge: HOME | End: 2025-06-27
Attending: SPECIALIST

## 2025-06-24 DIAGNOSIS — R06.02 SHORTNESS OF BREATH: ICD-10-CM

## 2025-06-24 DIAGNOSIS — R07.9 CHEST PAIN, UNSPECIFIED TYPE: ICD-10-CM

## 2025-06-24 DIAGNOSIS — E78.5 DYSLIPIDEMIA: ICD-10-CM

## 2025-06-24 PROCEDURE — 75571 CT HRT W/O DYE W/CA TEST: CPT

## 2025-08-07 DIAGNOSIS — I10 PRIMARY HYPERTENSION: ICD-10-CM

## 2025-08-07 DIAGNOSIS — R03.0 ELEVATED BLOOD PRESSURE READING: ICD-10-CM

## 2025-08-07 RX ORDER — LOSARTAN POTASSIUM 25 MG/1
25 TABLET ORAL DAILY
Qty: 90 TABLET | Refills: 3 | Status: SHIPPED | OUTPATIENT
Start: 2025-08-07

## (undated) DEVICE — CATH IV AUTOGRD BC BLU 22GA 25 -- INSYTE

## (undated) DEVICE — SET ADMIN 16ML TBNG L100IN 2 Y INJ SITE IV PIGGY BK DISP

## (undated) DEVICE — CATH IV AUTOGRD BC PNK 20GA 25 -- INSYTE

## (undated) DEVICE — SYR 3ML LL TIP 1/10ML GRAD --

## (undated) DEVICE — KIT COLON W/ 1.1OZ LUB AND 2 END

## (undated) DEVICE — NDL PRT INJ NSAF BLNT 18GX1.5 --

## (undated) DEVICE — 1200 GUARD II KIT W/5MM TUBE W/O VAC TUBE: Brand: GUARDIAN

## (undated) DEVICE — CUFF RMFG BP INF SZ 11 DISP -- LAWSON OEM ITEM 238915

## (undated) DEVICE — KENDALL RADIOLUCENT FOAM MONITORING ELECTRODE -RECTANGULAR SHAPE: Brand: KENDALL

## (undated) DEVICE — Device

## (undated) DEVICE — BAG BELONG PT PERS CLEAR HANDL

## (undated) DEVICE — SIMPLICITY FLUFF UNDERPAD 23X36, MODERATE: Brand: SIMPLICITY

## (undated) DEVICE — SOLIDIFIER MEDC 1200ML -- CONVERT TO 356117

## (undated) DEVICE — FCPS BX HOT LG 2.2MMX240CM

## (undated) DEVICE — TRAP SUC MUCOUS 70ML -- MEDICHOICE MEDLINE

## (undated) DEVICE — (D)SENSOR RMFG 02 PULS OXMTR -- DISC BY MFR USE ITEM 133445

## (undated) DEVICE — BAG SPEC BIOHZRD 10 X 10 IN --

## (undated) DEVICE — SNARE ENDOSCP M L240CM W27MM SHTH DIA2.4MM CHN 2.8MM OVL

## (undated) DEVICE — SYR 5ML 1/5 GRAD LL NSAF LF --

## (undated) DEVICE — ELECTRODE,RADIOTRANSLUCENT,FOAM,3PK: Brand: MEDLINE

## (undated) DEVICE — REM POLYHESIVE ADULT PATIENT RETURN ELECTRODE: Brand: VALLEYLAB

## (undated) DEVICE — 3M™ CUROS™ DISINFECTING CAP FOR NEEDLELESS CONNECTORS 270/CARTON 20 CARTONS/CASE CFF1-270: Brand: CUROS™

## (undated) DEVICE — FORCEPS BX L240CM JAW DIA2.8MM L CAP W/ NDL MIC MESH TOOTH

## (undated) DEVICE — ADULT SPO2 SENSOR: Brand: NELLCOR

## (undated) DEVICE — BASIN EMESIS 500CC ROSE 250/CS 60/PLT: Brand: MEDEGEN MEDICAL PRODUCTS, LLC

## (undated) DEVICE — CANN NASAL O2 CAPNOGRAPHY AD -- FILTERLINE

## (undated) DEVICE — CONTAINER SPEC 20 ML LID NEUT BUFF FORMALIN 10 % POLYPR STS

## (undated) DEVICE — NDL FLTR TIP 5 MIC 18GX1.5IN --